# Patient Record
Sex: MALE | Race: BLACK OR AFRICAN AMERICAN | NOT HISPANIC OR LATINO | Employment: UNEMPLOYED | RURAL
[De-identification: names, ages, dates, MRNs, and addresses within clinical notes are randomized per-mention and may not be internally consistent; named-entity substitution may affect disease eponyms.]

---

## 2021-03-18 LAB
CHOLEST SERPL-MSCNC: 231 MG/DL (ref 0–200)
HDLC SERPL-MCNC: 52 MG/DL (ref 35–70)
LDLC SERPL CALC-MCNC: 161 MG/DL (ref 0–160)
TRIGL SERPL-MCNC: 92 MG/DL (ref 40–160)

## 2021-04-15 ENCOUNTER — HOSPITAL ENCOUNTER (EMERGENCY)
Facility: HOSPITAL | Age: 51
Discharge: HOME | End: 2021-04-16
Attending: EMERGENCY MEDICINE
Payer: MEDICARE

## 2021-04-15 DIAGNOSIS — I95.0 IDIOPATHIC HYPOTENSION: Primary | ICD-10-CM

## 2021-04-15 LAB
ALBUMIN SERPL BCP-MCNC: 3.8 G/DL (ref 3.5–5)
ALBUMIN/GLOB SERPL: 0.9 {RATIO}
ALP SERPL-CCNC: 54 U/L (ref 45–115)
ALT SERPL W P-5'-P-CCNC: 20 U/L (ref 16–61)
AMPHETAMINE: NEGATIVE NG/ML
ANION GAP SERPL CALCULATED.3IONS-SCNC: 12 MMOL/L
AST SERPL W P-5'-P-CCNC: 16 U/L (ref 15–37)
BARBITURATES: NEGATIVE NG/ML
BASOPHILS # BLD AUTO: 0.06 X10E3/UL (ref 0–0.2)
BASOPHILS NFR BLD AUTO: 0.4 % (ref 0–1)
BENZODIAZEPINES: NEGATIVE NG/ML
BILIRUB SERPL-MCNC: 0.3 MG/DL (ref 0–1.2)
BILIRUB UR QL STRIP: NEGATIVE MG/DL
BUN SERPL-MCNC: 18 MG/DL (ref 7–18)
BUN/CREAT SERPL: 16.5
CALCIUM SERPL-MCNC: 8.3 MG/DL (ref 8.5–10.1)
CANNABINOID: NEGATIVE NG/ML
CHLORIDE SERPL-SCNC: 111 MMOL/L (ref 98–107)
CLARITY UR: CLEAR
CO2 SERPL-SCNC: 28 MMOL/L (ref 21–32)
COCAINE: NEGATIVE NG/ML
COLOR UR: YELLOW
CREAT SERPL-MCNC: 1.09 MG/DL (ref 0.7–1.3)
EOSINOPHIL # BLD AUTO: 0.2 X10E3/UL (ref 0–0.5)
EOSINOPHIL NFR BLD AUTO: 1.3 % (ref 1–4)
ERYTHROCYTE [DISTWIDTH] IN BLOOD BY AUTOMATED COUNT: 14 % (ref 11.5–14.5)
GLOBULIN SER-MCNC: 4.3 G/DL (ref 2–4)
GLUCOSE SERPL-MCNC: 90 MG/DL (ref 74–106)
GLUCOSE UR STRIP-MCNC: NEGATIVE MG/DL
HCT VFR BLD AUTO: 44 % (ref 40–54)
HGB BLD-MCNC: 14.1 G/DL (ref 13.5–18)
IMM GRANULOCYTES # BLD AUTO: 0.06 X10E3/UL (ref 0–0.04)
IMM GRANULOCYTES NFR BLD: 0.4 % (ref 0–0.4)
KETONES UR STRIP-SCNC: ABNORMAL MG/DL
LEUKOCYTE ESTERASE UR QL STRIP: NEGATIVE LEU/UL
LYMPHOCYTES # BLD AUTO: 2 X10E3/UL (ref 1–4.8)
LYMPHOCYTES NFR BLD AUTO: 13 % (ref 27–41)
MCH RBC QN AUTO: 28.4 PG (ref 27–31)
MCHC RBC AUTO-ENTMCNC: 32 G/DL (ref 32–36)
MCV RBC AUTO: 88.5 FL (ref 80–96)
MONOCYTES # BLD AUTO: 0.71 X10E3/UL (ref 0–0.8)
MONOCYTES NFR BLD AUTO: 4.6 % (ref 2–6)
MPC BLD CALC-MCNC: 10.5 FL (ref 9.4–12.4)
NEUTROPHILS # BLD AUTO: 12.33 X10E3/UL (ref 1.8–7.7)
NEUTROPHILS NFR BLD AUTO: 80.3 % (ref 53–65)
NITRITE UR QL STRIP: NEGATIVE
NRBC # BLD AUTO: 0 X10E3/UL (ref 0–0)
NRBC, AUTO (.00): 0 /100 (ref 0–0)
OPIATES: NEGATIVE NG/ML
PH UR STRIP: 6 PH UNITS (ref 5–8)
PHENCYCLIDINE: POSITIVE NG/ML
PLATELET # BLD AUTO: 264 X10E3/UL (ref 150–400)
POTASSIUM SERPL-SCNC: 3.8 MMOL/L (ref 3.5–5.1)
PROT SERPL-MCNC: 8.1 G/DL (ref 6.4–8.2)
PROT UR QL STRIP: ABNORMAL MG/DL
RBC # BLD AUTO: 4.97 X10E6/UL (ref 4.6–6.2)
RBC # UR STRIP: NEGATIVE ERY/UL
SODIUM SERPL-SCNC: 147 MMOL/L (ref 136–145)
SP GR UR STRIP: >=1.03 (ref 1–1.03)
UROBILINOGEN UR STRIP-ACNC: 1 EU/DL
WBC # BLD AUTO: 15.36 X10E3/UL (ref 4.5–11)

## 2021-04-15 PROCEDURE — 99284 PR EMERGENCY DEPT VISIT,LEVEL IV: ICD-10-PCS | Mod: ,,, | Performed by: EMERGENCY MEDICINE

## 2021-04-15 PROCEDURE — 36415 COLL VENOUS BLD VENIPUNCTURE: CPT

## 2021-04-15 PROCEDURE — 85025 COMPLETE CBC W/AUTO DIFF WBC: CPT

## 2021-04-15 PROCEDURE — 96360 HYDRATION IV INFUSION INIT: CPT

## 2021-04-15 PROCEDURE — 99284 EMERGENCY DEPT VISIT MOD MDM: CPT | Mod: ,,, | Performed by: EMERGENCY MEDICINE

## 2021-04-15 PROCEDURE — 25000003 PHARM REV CODE 250: Performed by: EMERGENCY MEDICINE

## 2021-04-15 PROCEDURE — 80053 COMPREHEN METABOLIC PANEL: CPT

## 2021-04-15 PROCEDURE — 81003 URINALYSIS AUTO W/O SCOPE: CPT

## 2021-04-15 PROCEDURE — 99283 EMERGENCY DEPT VISIT LOW MDM: CPT | Mod: 25

## 2021-04-15 RX ADMIN — SODIUM CHLORIDE 1000 ML: 9 INJECTION, SOLUTION INTRAVENOUS at 07:04

## 2021-04-16 VITALS
BODY MASS INDEX: 23.7 KG/M2 | OXYGEN SATURATION: 100 % | HEIGHT: 69 IN | HEART RATE: 60 BPM | RESPIRATION RATE: 13 BRPM | DIASTOLIC BLOOD PRESSURE: 87 MMHG | WEIGHT: 160 LBS | TEMPERATURE: 98 F | SYSTOLIC BLOOD PRESSURE: 131 MMHG

## 2021-04-21 DIAGNOSIS — Z12.11 SCREEN FOR COLON CANCER: Primary | ICD-10-CM

## 2022-01-18 ENCOUNTER — OFFICE VISIT (OUTPATIENT)
Dept: FAMILY MEDICINE | Facility: CLINIC | Age: 52
End: 2022-01-18
Payer: MEDICAID

## 2022-01-18 VITALS — DIASTOLIC BLOOD PRESSURE: 102 MMHG | SYSTOLIC BLOOD PRESSURE: 156 MMHG | HEART RATE: 90 BPM

## 2022-01-18 DIAGNOSIS — I10 PRIMARY HYPERTENSION: Primary | Chronic | ICD-10-CM

## 2022-01-18 DIAGNOSIS — Z86.73 HISTORY OF CARDIOEMBOLIC CEREBROVASCULAR ACCIDENT (CVA): Chronic | ICD-10-CM

## 2022-01-18 DIAGNOSIS — I25.10 CORONARY ARTERY DISEASE, UNSPECIFIED VESSEL OR LESION TYPE, UNSPECIFIED WHETHER ANGINA PRESENT, UNSPECIFIED WHETHER NATIVE OR TRANSPLANTED HEART: Chronic | ICD-10-CM

## 2022-01-18 DIAGNOSIS — I25.2 HISTORY OF MI (MYOCARDIAL INFARCTION): Chronic | ICD-10-CM

## 2022-01-18 DIAGNOSIS — E78.2 MIXED HYPERLIPIDEMIA: Chronic | ICD-10-CM

## 2022-01-18 DIAGNOSIS — G81.91 RIGHT HEMIPLEGIA: Chronic | ICD-10-CM

## 2022-01-18 DIAGNOSIS — R47.01 EXPRESSIVE APHASIA: Chronic | ICD-10-CM

## 2022-01-18 DIAGNOSIS — J44.9 CHRONIC OBSTRUCTIVE PULMONARY DISEASE, UNSPECIFIED COPD TYPE: Chronic | ICD-10-CM

## 2022-01-18 PROCEDURE — 1160F PR REVIEW ALL MEDS BY PRESCRIBER/CLIN PHARMACIST DOCUMENTED: ICD-10-PCS | Mod: CPTII,,, | Performed by: FAMILY MEDICINE

## 2022-01-18 PROCEDURE — 1159F MED LIST DOCD IN RCRD: CPT | Mod: CPTII,,, | Performed by: FAMILY MEDICINE

## 2022-01-18 PROCEDURE — 4010F ACE/ARB THERAPY RXD/TAKEN: CPT | Mod: CPTII,,, | Performed by: FAMILY MEDICINE

## 2022-01-18 PROCEDURE — 3080F PR MOST RECENT DIASTOLIC BLOOD PRESSURE >= 90 MM HG: ICD-10-PCS | Mod: CPTII,,, | Performed by: FAMILY MEDICINE

## 2022-01-18 PROCEDURE — 99214 PR OFFICE/OUTPT VISIT, EST, LEVL IV, 30-39 MIN: ICD-10-PCS | Mod: ,,, | Performed by: FAMILY MEDICINE

## 2022-01-18 PROCEDURE — 3080F DIAST BP >= 90 MM HG: CPT | Mod: CPTII,,, | Performed by: FAMILY MEDICINE

## 2022-01-18 PROCEDURE — 3077F SYST BP >= 140 MM HG: CPT | Mod: CPTII,,, | Performed by: FAMILY MEDICINE

## 2022-01-18 PROCEDURE — 99214 OFFICE O/P EST MOD 30 MIN: CPT | Mod: ,,, | Performed by: FAMILY MEDICINE

## 2022-01-18 PROCEDURE — 1160F RVW MEDS BY RX/DR IN RCRD: CPT | Mod: CPTII,,, | Performed by: FAMILY MEDICINE

## 2022-01-18 PROCEDURE — 3077F PR MOST RECENT SYSTOLIC BLOOD PRESSURE >= 140 MM HG: ICD-10-PCS | Mod: CPTII,,, | Performed by: FAMILY MEDICINE

## 2022-01-18 PROCEDURE — 1159F PR MEDICATION LIST DOCUMENTED IN MEDICAL RECORD: ICD-10-PCS | Mod: CPTII,,, | Performed by: FAMILY MEDICINE

## 2022-01-18 PROCEDURE — 4010F PR ACE/ARB THEARPY RXD/TAKEN: ICD-10-PCS | Mod: CPTII,,, | Performed by: FAMILY MEDICINE

## 2022-01-18 RX ORDER — IPRATROPIUM BROMIDE AND ALBUTEROL SULFATE 2.5; .5 MG/3ML; MG/3ML
3 SOLUTION RESPIRATORY (INHALATION) EVERY 6 HOURS PRN
Qty: 75 ML | Refills: 3 | Status: SHIPPED | OUTPATIENT
Start: 2022-01-18 | End: 2023-06-14

## 2022-01-18 RX ORDER — LISINOPRIL 20 MG/1
20 TABLET ORAL DAILY
Qty: 90 TABLET | Refills: 3 | Status: SHIPPED | OUTPATIENT
Start: 2022-01-18 | End: 2022-06-30 | Stop reason: SDUPTHER

## 2022-01-18 NOTE — PROGRESS NOTES
Gilmar Harris MD   [facility]  MEDICAL GROUP [address] [address]  [phone]      PATIENT NAME: Gm Babcock  : 1970  DATE: 22  MRN: [id number]      Billing Provider: Gilmar Harris MD  Level of Service:   Patient PCP Information       Provider PCP Type    Gilmar Harris MD General            Reason for Visit / Chief Complaint: Follow-up and Cough       Update PCP  Update Chief Complaint         History of Present Illness / Problem Focused Workflow     Gm Babcock presents to the clinic with Follow-up and Cough       50 yo AAM with h/o HTN, hyperlpidemia, CAD, MI and CVA.  Has right sided hemiplegia and expressive aphasia.  He needs a couple of meds refilled today.      Review of Systems     Review of Systems   Respiratory: Positive for cough.    Musculoskeletal: Positive for gait problem.   Neurological: Positive for speech difficulty, weakness, disturbances in coordination and coordination difficulties.        Medical / Social / Family History     Past Medical History:   Diagnosis Date    Myocardial infarction     Seizures        History reviewed. No pertinent surgical history.    Social History    reports that he has quit smoking. He has never used smokeless tobacco. He reports current alcohol use.   Social History     Tobacco Use    Smoking status: Former Smoker    Smokeless tobacco: Never Used   Substance Use Topics    Alcohol use: Yes       Family History  Family History   Problem Relation Age of Onset    Coronary artery disease Mother        Medications and Allergies     Medications  No outpatient medications have been marked as taking for the 22 encounter (Office Visit) with Gilmar Harris MD.       Allergies  Review of patient's allergies indicates:  No Known Allergies    Physical Examination     Vitals:    22 1520   BP: (!) 156/102   Pulse: 90     Physical Exam  Vitals reviewed.    HENT:      Head: Normocephalic and atraumatic.   Eyes:      Extraocular Movements: Extraocular movements intact.      Conjunctiva/sclera: Conjunctivae normal.      Pupils: Pupils are equal, round, and reactive to light.   Cardiovascular:      Rate and Rhythm: Normal rate and regular rhythm.      Heart sounds: Normal heart sounds.   Pulmonary:      Effort: Pulmonary effort is normal.      Breath sounds: Normal breath sounds.   Musculoskeletal:      Cervical back: Normal range of motion and neck supple.   Skin:     General: Skin is warm and dry.   Neurological:      Mental Status: He is alert. Mental status is at baseline.      Motor: Weakness present.      Gait: Gait abnormal.   Psychiatric:         Mood and Affect: Mood normal.         Behavior: Behavior normal.          Assessment and Plan (including Health Maintenance)      Problem List  Smart Sets  Document Outside HM   :    Plan: continue current care        Health Maintenance Due   Topic Date Due    Hepatitis C Screening  Never done    Lipid Panel  Never done    COVID-19 Vaccine (1) Never done    Pneumococcal Vaccines (Age 0-64) (1 of 2 - PPSV23) Never done    HIV Screening  Never done    TETANUS VACCINE  Never done    High Dose Statin  Never done    Colorectal Cancer Screening  Never done    Shingles Vaccine (1 of 2) Never done    Influenza Vaccine (1) Never done       Problem List Items Addressed This Visit        Neuro    History of cardioembolic cerebrovascular accident (CVA) (Chronic)    Right hemiplegia (Chronic)    Expressive aphasia (Chronic)       Pulmonary    Chronic obstructive pulmonary disease (Chronic)    Relevant Medications    albuterol-ipratropium (DUO-NEB) 2.5 mg-0.5 mg/3 mL nebulizer solution       Cardiac/Vascular    Primary hypertension - Primary (Chronic)    Relevant Medications    lisinopriL (PRINIVIL,ZESTRIL) 20 MG tablet    Mixed hyperlipidemia (Chronic)    Coronary artery disease (Chronic)    History of MI (myocardial  infarction) (Chronic)          The patient has no Health Maintenance topics of status Not Due    Future Appointments   Date Time Provider Department Center   3/18/2022  9:00 AM Gilmar Harris MD [facility]            Signature:  MD [facility]AC[facility]  MEDICAL GROUP OF [facility]  [address] [place] [address] [place] [id number]  [phone]    Date of encounter: 1/18/22

## 2022-06-30 ENCOUNTER — OFFICE VISIT (OUTPATIENT)
Dept: FAMILY MEDICINE | Facility: CLINIC | Age: 52
End: 2022-06-30
Payer: MEDICAID

## 2022-06-30 VITALS
DIASTOLIC BLOOD PRESSURE: 81 MMHG | BODY MASS INDEX: 23.7 KG/M2 | HEIGHT: 69 IN | SYSTOLIC BLOOD PRESSURE: 114 MMHG | HEART RATE: 82 BPM | WEIGHT: 160 LBS

## 2022-06-30 DIAGNOSIS — I25.10 CORONARY ARTERY DISEASE, UNSPECIFIED VESSEL OR LESION TYPE, UNSPECIFIED WHETHER ANGINA PRESENT, UNSPECIFIED WHETHER NATIVE OR TRANSPLANTED HEART: Chronic | ICD-10-CM

## 2022-06-30 DIAGNOSIS — G81.91 RIGHT HEMIPLEGIA: Chronic | ICD-10-CM

## 2022-06-30 DIAGNOSIS — I10 PRIMARY HYPERTENSION: Primary | Chronic | ICD-10-CM

## 2022-06-30 DIAGNOSIS — R47.01 EXPRESSIVE APHASIA: Chronic | ICD-10-CM

## 2022-06-30 DIAGNOSIS — I25.2 HISTORY OF MI (MYOCARDIAL INFARCTION): Chronic | ICD-10-CM

## 2022-06-30 DIAGNOSIS — Z86.73 HISTORY OF CARDIOEMBOLIC CEREBROVASCULAR ACCIDENT (CVA): Chronic | ICD-10-CM

## 2022-06-30 PROCEDURE — 1160F RVW MEDS BY RX/DR IN RCRD: CPT | Mod: CPTII,,, | Performed by: FAMILY MEDICINE

## 2022-06-30 PROCEDURE — 3074F SYST BP LT 130 MM HG: CPT | Mod: CPTII,,, | Performed by: FAMILY MEDICINE

## 2022-06-30 PROCEDURE — 3008F BODY MASS INDEX DOCD: CPT | Mod: CPTII,,, | Performed by: FAMILY MEDICINE

## 2022-06-30 PROCEDURE — 1159F PR MEDICATION LIST DOCUMENTED IN MEDICAL RECORD: ICD-10-PCS | Mod: CPTII,,, | Performed by: FAMILY MEDICINE

## 2022-06-30 PROCEDURE — 4010F ACE/ARB THERAPY RXD/TAKEN: CPT | Mod: CPTII,,, | Performed by: FAMILY MEDICINE

## 2022-06-30 PROCEDURE — 99214 OFFICE O/P EST MOD 30 MIN: CPT | Mod: ,,, | Performed by: FAMILY MEDICINE

## 2022-06-30 PROCEDURE — 1160F PR REVIEW ALL MEDS BY PRESCRIBER/CLIN PHARMACIST DOCUMENTED: ICD-10-PCS | Mod: CPTII,,, | Performed by: FAMILY MEDICINE

## 2022-06-30 PROCEDURE — 99214 PR OFFICE/OUTPT VISIT, EST, LEVL IV, 30-39 MIN: ICD-10-PCS | Mod: ,,, | Performed by: FAMILY MEDICINE

## 2022-06-30 PROCEDURE — 3008F PR BODY MASS INDEX (BMI) DOCUMENTED: ICD-10-PCS | Mod: CPTII,,, | Performed by: FAMILY MEDICINE

## 2022-06-30 PROCEDURE — 4010F PR ACE/ARB THEARPY RXD/TAKEN: ICD-10-PCS | Mod: CPTII,,, | Performed by: FAMILY MEDICINE

## 2022-06-30 PROCEDURE — 3079F DIAST BP 80-89 MM HG: CPT | Mod: CPTII,,, | Performed by: FAMILY MEDICINE

## 2022-06-30 PROCEDURE — 3074F PR MOST RECENT SYSTOLIC BLOOD PRESSURE < 130 MM HG: ICD-10-PCS | Mod: CPTII,,, | Performed by: FAMILY MEDICINE

## 2022-06-30 PROCEDURE — 3079F PR MOST RECENT DIASTOLIC BLOOD PRESSURE 80-89 MM HG: ICD-10-PCS | Mod: CPTII,,, | Performed by: FAMILY MEDICINE

## 2022-06-30 PROCEDURE — 1159F MED LIST DOCD IN RCRD: CPT | Mod: CPTII,,, | Performed by: FAMILY MEDICINE

## 2022-06-30 RX ORDER — LISINOPRIL 20 MG/1
20 TABLET ORAL DAILY
Qty: 90 TABLET | Refills: 1 | Status: SHIPPED | OUTPATIENT
Start: 2022-06-30 | End: 2023-06-09 | Stop reason: SDUPTHER

## 2022-06-30 NOTE — PROGRESS NOTES
Gilmar Harris MD   [facility]  MEDICAL GROUP [address] [address]  [phone]      PATIENT NAME: Gm Babcock  : 1970  DATE: 22  MRN: [id number]      Billing Provider: Gilmar Harris MD  Level of Service:   Patient PCP Information       Provider PCP Type    Gilmar Harris MD General            Reason for Visit / Chief Complaint: Follow-up (Follow-up. Medication refills. )       Update PCP  Update Chief Complaint         History of Present Illness / Problem Focused Workflow     Gm Babcock presents to the clinic with Follow-up (Follow-up. Medication refills. )          50 yo AAM with h/o HTN, hyperlpidemia, CAD, MI and CVA.  Has right sided hemiplegia and expressive aphasia.  He is here today to have some medications refilled.  He denies being in any pain.  No new/qacute complaints.      Review of Systems     Review of Systems   Respiratory: Negative for cough.    Musculoskeletal: Positive for gait problem. Negative for arthralgias.   Neurological: Positive for speech difficulty, weakness, disturbances in coordination and coordination difficulties.        Medical / Social / Family History     Past Medical History:   Diagnosis Date    Myocardial infarction     Seizures        History reviewed. No pertinent surgical history.    Social History    reports that he has quit smoking. He has never used smokeless tobacco. He reports current alcohol use.   Social History     Tobacco Use    Smoking status: Former Smoker    Smokeless tobacco: Never Used   Substance Use Topics    Alcohol use: Yes       Family History  Family History   Problem Relation Age of Onset    Coronary artery disease Mother        Medications and Allergies     Medications  Outpatient Medications Marked as Taking for the 22 encounter (Office Visit) with Gilmar Harris MD   Medication Sig Dispense Refill    albuterol-ipratropium (DUO-NEB)  2.5 mg-0.5 mg/3 mL nebulizer solution Take 3 mLs by nebulization every 6 (six) hours as needed for Wheezing. Rescue 75 mL 3    [DISCONTINUED] ELIQUIS 5 mg Tab TAKE ONE TABLET BY MOUTH TWICE DAILY 60 tablet 0    [DISCONTINUED] lisinopriL (PRINIVIL,ZESTRIL) 20 MG tablet Take 1 tablet (20 mg total) by mouth once daily. 90 tablet 3       Allergies  Review of patient's allergies indicates:  No Known Allergies    Physical Examination     Vitals:    06/30/22 1527   BP: 114/81   Pulse: 82     Physical Exam  Vitals reviewed.   HENT:      Head: Normocephalic and atraumatic.   Eyes:      Extraocular Movements: Extraocular movements intact.      Conjunctiva/sclera: Conjunctivae normal.      Pupils: Pupils are equal, round, and reactive to light.   Cardiovascular:      Rate and Rhythm: Normal rate and regular rhythm.      Heart sounds: Normal heart sounds.   Pulmonary:      Effort: Pulmonary effort is normal.      Breath sounds: Normal breath sounds.   Musculoskeletal:      Cervical back: Normal range of motion and neck supple.      Comments: Contracture of RUE   Skin:     General: Skin is warm and dry.   Neurological:      Mental Status: He is alert. Mental status is at baseline.      Motor: Weakness (RUE and RLE) present.      Gait: Gait abnormal (WC bound).   Psychiatric:         Mood and Affect: Mood normal.         Behavior: Behavior normal.          Assessment and Plan (including Health Maintenance)      Problem List  Smart Sets  Document Outside HM   :    Plan:         Health Maintenance Due   Topic Date Due    Hepatitis C Screening  Never done    Pneumococcal Vaccines (Age 0-64) (1 - PCV) Never done    HIV Screening  Never done    High Dose Statin  Never done    Colorectal Cancer Screening  Never done    COVID-19 Vaccine (2 - Moderna series) 10/21/2021       Problem List Items Addressed This Visit        Neuro    History of cardioembolic cerebrovascular accident (CVA) (Chronic)    Relevant Medications    apixaban  (ELIQUIS) 5 mg Tab    Right hemiplegia (Chronic)    Expressive aphasia (Chronic)       Cardiac/Vascular    Primary hypertension - Primary (Chronic)    Relevant Medications    lisinopriL (PRINIVIL,ZESTRIL) 20 MG tablet    Coronary artery disease (Chronic)    History of MI (myocardial infarction) (Chronic)          Health Maintenance Topics with due status: Not Due       Topic Last Completion Date    Lipid Panel 03/18/2021    Influenza Vaccine Not Due       No future appointments.         Signature:  MD RACHEL Stewart [facility]  MEDICAL GROUP [facility] - FAMILY MEDICINE  [address] [place] [id number]  [phone]    Date of encounter: 6/30/22

## 2022-07-31 ENCOUNTER — HOSPITAL ENCOUNTER (EMERGENCY)
Facility: HOSPITAL | Age: 52
Discharge: HOME | End: 2022-08-01
Attending: EMERGENCY MEDICINE
Payer: MEDICAID

## 2022-07-31 DIAGNOSIS — E03.8 OTHER SPECIFIED HYPOTHYROIDISM: Primary | ICD-10-CM

## 2022-07-31 DIAGNOSIS — I63.9 STROKE: ICD-10-CM

## 2022-07-31 PROCEDURE — 85610 PROTHROMBIN TIME: CPT | Performed by: EMERGENCY MEDICINE

## 2022-07-31 PROCEDURE — 80061 LIPID PANEL: CPT | Performed by: EMERGENCY MEDICINE

## 2022-07-31 PROCEDURE — 85025 COMPLETE CBC W/AUTO DIFF WBC: CPT | Performed by: EMERGENCY MEDICINE

## 2022-07-31 PROCEDURE — 99285 EMERGENCY DEPT VISIT HI MDM: CPT

## 2022-07-31 PROCEDURE — 80053 COMPREHEN METABOLIC PANEL: CPT | Performed by: EMERGENCY MEDICINE

## 2022-07-31 PROCEDURE — 36415 COLL VENOUS BLD VENIPUNCTURE: CPT | Performed by: EMERGENCY MEDICINE

## 2022-07-31 PROCEDURE — 99284 EMERGENCY DEPT VISIT MOD MDM: CPT | Mod: ,,, | Performed by: EMERGENCY MEDICINE

## 2022-07-31 PROCEDURE — 99284 PR EMERGENCY DEPT VISIT,LEVEL IV: ICD-10-PCS | Mod: ,,, | Performed by: EMERGENCY MEDICINE

## 2022-07-31 PROCEDURE — 84443 ASSAY THYROID STIM HORMONE: CPT | Performed by: EMERGENCY MEDICINE

## 2022-08-01 VITALS
BODY MASS INDEX: 25.18 KG/M2 | RESPIRATION RATE: 15 BRPM | HEART RATE: 80 BPM | WEIGHT: 170 LBS | OXYGEN SATURATION: 99 % | HEIGHT: 69 IN | DIASTOLIC BLOOD PRESSURE: 88 MMHG | TEMPERATURE: 99 F | SYSTOLIC BLOOD PRESSURE: 131 MMHG

## 2022-08-01 LAB
ALBUMIN SERPL BCP-MCNC: 4.1 G/DL (ref 3.5–5)
ALBUMIN/GLOB SERPL: 0.9 {RATIO}
ALP SERPL-CCNC: 60 U/L (ref 45–115)
ALT SERPL W P-5'-P-CCNC: 14 U/L (ref 16–61)
ANION GAP SERPL CALCULATED.3IONS-SCNC: 12 MMOL/L (ref 7–16)
AST SERPL W P-5'-P-CCNC: 14 U/L (ref 15–37)
BASOPHILS # BLD AUTO: 0.04 K/UL (ref 0–0.2)
BASOPHILS NFR BLD AUTO: 0.5 % (ref 0–1)
BILIRUB SERPL-MCNC: 0.6 MG/DL (ref 0–1.2)
BUN SERPL-MCNC: 15 MG/DL (ref 7–18)
BUN/CREAT SERPL: 17 (ref 6–20)
CALCIUM SERPL-MCNC: 9.8 MG/DL (ref 8.5–10.1)
CHLORIDE SERPL-SCNC: 106 MMOL/L (ref 98–107)
CHOLEST SERPL-MCNC: 206 MG/DL (ref 0–200)
CHOLEST/HDLC SERPL: 4.5 {RATIO}
CO2 SERPL-SCNC: 26 MMOL/L (ref 21–32)
CREAT SERPL-MCNC: 0.89 MG/DL (ref 0.7–1.3)
DIFFERENTIAL METHOD BLD: ABNORMAL
EOSINOPHIL # BLD AUTO: 0.26 K/UL (ref 0–0.5)
EOSINOPHIL NFR BLD AUTO: 3.2 % (ref 1–4)
ERYTHROCYTE [DISTWIDTH] IN BLOOD BY AUTOMATED COUNT: 13.9 % (ref 11.5–14.5)
GLOBULIN SER-MCNC: 4.7 G/DL (ref 2–4)
GLUCOSE SERPL-MCNC: 81 MG/DL (ref 74–106)
HCT VFR BLD AUTO: 44.8 % (ref 40–54)
HDLC SERPL-MCNC: 46 MG/DL (ref 40–60)
HGB BLD-MCNC: 14.8 G/DL (ref 13.5–18)
IMM GRANULOCYTES # BLD AUTO: 0.01 K/UL (ref 0–0.04)
IMM GRANULOCYTES NFR BLD: 0.1 % (ref 0–0.4)
INR BLD: 1.14
LDLC SERPL CALC-MCNC: 139 MG/DL
LDLC/HDLC SERPL: 3 {RATIO}
LYMPHOCYTES # BLD AUTO: 3.17 K/UL (ref 1–4.8)
LYMPHOCYTES NFR BLD AUTO: 39.5 % (ref 27–41)
MCH RBC QN AUTO: 28.1 PG (ref 27–31)
MCHC RBC AUTO-ENTMCNC: 33 G/DL (ref 32–36)
MCV RBC AUTO: 85.2 FL (ref 80–96)
MONOCYTES # BLD AUTO: 0.6 K/UL (ref 0–0.8)
MONOCYTES NFR BLD AUTO: 7.5 % (ref 2–6)
MPC BLD CALC-MCNC: 11.2 FL (ref 9.4–12.4)
NEUTROPHILS # BLD AUTO: 3.94 K/UL (ref 1.8–7.7)
NEUTROPHILS NFR BLD AUTO: 49.2 % (ref 53–65)
NONHDLC SERPL-MCNC: 160 MG/DL
NRBC # BLD AUTO: 0 X10E3/UL
NRBC, AUTO (.00): 0 %
PLATELET # BLD AUTO: 335 K/UL (ref 150–400)
POTASSIUM SERPL-SCNC: 4 MMOL/L (ref 3.5–5.1)
PROT SERPL-MCNC: 8.8 G/DL (ref 6.4–8.2)
PROTHROMBIN TIME: 14.2 SECONDS (ref 11.7–14.7)
RBC # BLD AUTO: 5.26 M/UL (ref 4.6–6.2)
SODIUM SERPL-SCNC: 140 MMOL/L (ref 136–145)
TRIGL SERPL-MCNC: 107 MG/DL (ref 35–150)
TSH SERPL DL<=0.005 MIU/L-ACNC: 3.81 UIU/ML (ref 0.36–3.74)
VLDLC SERPL-MCNC: 21 MG/DL
WBC # BLD AUTO: 8.02 K/UL (ref 4.5–11)

## 2022-08-01 NOTE — ED TRIAGE NOTES
FAMILY BRINGS PATIENT TO ER WITH REPORT OF GENERALIZED WEAKNESS SINCE 5PM ON 7/30/2022. REPORTS SHE NOTICED HE HAD SOME FACIAL DROOPING ON LEFT SIDE AND MORE WEAKNESS THAN USUAL. PATIENT HAS HAD A STROKE IN THE PAST WHICH AFFECTED HIS RIGHT SIDE.

## 2022-08-03 DIAGNOSIS — E03.8 OTHER SPECIFIED HYPOTHYROIDISM: ICD-10-CM

## 2022-08-03 DIAGNOSIS — I63.9 CEREBROVASCULAR ACCIDENT (CVA), UNSPECIFIED MECHANISM: ICD-10-CM

## 2022-08-03 DIAGNOSIS — G81.91 RIGHT HEMIPLEGIA: ICD-10-CM

## 2022-10-14 ENCOUNTER — OFFICE VISIT (OUTPATIENT)
Dept: NEUROLOGY | Facility: CLINIC | Age: 52
End: 2022-10-14
Payer: MEDICAID

## 2022-10-14 VITALS
WEIGHT: 162 LBS | OXYGEN SATURATION: 98 % | RESPIRATION RATE: 20 BRPM | DIASTOLIC BLOOD PRESSURE: 88 MMHG | HEART RATE: 68 BPM | BODY MASS INDEX: 23.99 KG/M2 | HEIGHT: 69 IN | SYSTOLIC BLOOD PRESSURE: 122 MMHG

## 2022-10-14 DIAGNOSIS — Z86.73 HISTORY OF CARDIOEMBOLIC CEREBROVASCULAR ACCIDENT (CVA): Primary | Chronic | ICD-10-CM

## 2022-10-14 PROCEDURE — 3079F PR MOST RECENT DIASTOLIC BLOOD PRESSURE 80-89 MM HG: ICD-10-PCS | Mod: CPTII,,, | Performed by: PSYCHIATRY & NEUROLOGY

## 2022-10-14 PROCEDURE — 99214 OFFICE O/P EST MOD 30 MIN: CPT | Mod: PBBFAC | Performed by: PSYCHIATRY & NEUROLOGY

## 2022-10-14 PROCEDURE — 3079F DIAST BP 80-89 MM HG: CPT | Mod: CPTII,,, | Performed by: PSYCHIATRY & NEUROLOGY

## 2022-10-14 PROCEDURE — 3074F PR MOST RECENT SYSTOLIC BLOOD PRESSURE < 130 MM HG: ICD-10-PCS | Mod: CPTII,,, | Performed by: PSYCHIATRY & NEUROLOGY

## 2022-10-14 PROCEDURE — 99204 PR OFFICE/OUTPT VISIT, NEW, LEVL IV, 45-59 MIN: ICD-10-PCS | Mod: S$PBB,,, | Performed by: PSYCHIATRY & NEUROLOGY

## 2022-10-14 PROCEDURE — 1160F PR REVIEW ALL MEDS BY PRESCRIBER/CLIN PHARMACIST DOCUMENTED: ICD-10-PCS | Mod: CPTII,,, | Performed by: PSYCHIATRY & NEUROLOGY

## 2022-10-14 PROCEDURE — 3074F SYST BP LT 130 MM HG: CPT | Mod: CPTII,,, | Performed by: PSYCHIATRY & NEUROLOGY

## 2022-10-14 PROCEDURE — 4010F PR ACE/ARB THEARPY RXD/TAKEN: ICD-10-PCS | Mod: CPTII,,, | Performed by: PSYCHIATRY & NEUROLOGY

## 2022-10-14 PROCEDURE — 4010F ACE/ARB THERAPY RXD/TAKEN: CPT | Mod: CPTII,,, | Performed by: PSYCHIATRY & NEUROLOGY

## 2022-10-14 PROCEDURE — 99204 OFFICE O/P NEW MOD 45 MIN: CPT | Mod: S$PBB,,, | Performed by: PSYCHIATRY & NEUROLOGY

## 2022-10-14 PROCEDURE — 1159F MED LIST DOCD IN RCRD: CPT | Mod: CPTII,,, | Performed by: PSYCHIATRY & NEUROLOGY

## 2022-10-14 PROCEDURE — 1159F PR MEDICATION LIST DOCUMENTED IN MEDICAL RECORD: ICD-10-PCS | Mod: CPTII,,, | Performed by: PSYCHIATRY & NEUROLOGY

## 2022-10-14 PROCEDURE — 1160F RVW MEDS BY RX/DR IN RCRD: CPT | Mod: CPTII,,, | Performed by: PSYCHIATRY & NEUROLOGY

## 2022-10-14 RX ORDER — ASPIRIN 81 MG/1
81 TABLET ORAL DAILY
COMMUNITY

## 2022-10-14 RX ORDER — ATORVASTATIN CALCIUM 40 MG/1
40 TABLET, FILM COATED ORAL DAILY
COMMUNITY
End: 2023-06-09 | Stop reason: SDUPTHER

## 2022-10-14 NOTE — PATIENT INSTRUCTIONS
Cont current meds   Cont ASPIRIN and eliquis and statin  Cont smoking cessation and ETOH cessation   F/u 6 months

## 2022-10-14 NOTE — PROGRESS NOTES
"    Subjective:       Patient ID: Gm Babcock is a 51 y.o. male     Chief Complaint:    Chief Complaint   Patient presents with    Stroke        Allergies:  Patient has no known allergies.    Current Medications:    Outpatient Encounter Medications as of 10/14/2022   Medication Sig Dispense Refill    albuterol-ipratropium (DUO-NEB) 2.5 mg-0.5 mg/3 mL nebulizer solution Take 3 mLs by nebulization every 6 (six) hours as needed for Wheezing. Rescue 75 mL 3    apixaban (ELIQUIS) 5 mg Tab Take 1 tablet (5 mg total) by mouth 2 (two) times daily. 180 tablet 1    aspirin (ECOTRIN) 81 MG EC tablet Take 81 mg by mouth once daily.      atorvastatin (LIPITOR) 40 MG tablet Take 40 mg by mouth once daily.      lisinopriL (PRINIVIL,ZESTRIL) 20 MG tablet Take 1 tablet (20 mg total) by mouth once daily. 90 tablet 1     No facility-administered encounter medications on file as of 10/14/2022.       History of Present Illness  52 yo BM s/p L MCA stroke 3-5 yrs ago w/ residual expressive aphasia and R hemiparesis  Cared fo rby sister and wheelchair bound   Pt never got any proper PT/Rehab after his stroke according to sister  Now on Eliquis , ASPIRIN , statin   Few yrs ago had spell similar to TRANSIENT ISCHEMIC ATTACK but CT head negative and per sister had "thyroid issues" ?        Past Medical History:   Diagnosis Date    Myocardial infarction     Seizures     Stroke        History reviewed. No pertinent surgical history.    Social History  Mr. Babcock  reports that he has quit smoking. He has never used smokeless tobacco. He reports current alcohol use.    Family History  Mr.'s Babcock family history includes Coronary artery disease in his mother.    Review of Systems  Review of Systems   Neurological:  Positive for focal weakness and weakness.   All other systems reviewed and are negative.   Objective:   /88 (BP Location: Left arm, Patient Position: Sitting, BP Method: Large (Manual))   Pulse 68   Resp 20   Ht 5' 9" (1.753 " m)   Wt 73.5 kg (162 lb)   SpO2 98%   BMI 23.92 kg/m²    NEUROLOGICAL EXAMINATION:     MENTAL STATUS   Level of consciousness: alert  Knowledge: consistent with education.   Normal comprehension.        Expressive aphasia      CRANIAL NERVES   Cranial nerves II through XII intact.        Mild facial droop      MOTOR EXAM        Residual R hemiparesis from stroke     GAIT AND COORDINATION        Wheelchari bound      Physical Exam  Vitals reviewed.   Constitutional:       Appearance: He is normal weight.   Neurological:      Mental Status: Mental status is at baseline.      Cranial Nerves: Cranial nerves 2-12 are intact.        Assessment:     History of cardioembolic cerebrovascular accident (CVA)       Primary Diagnosis and ICD10  History of cardioembolic cerebrovascular accident (CVA) [Z86.73]    Plan:     Patient Instructions   Cont current meds   Cont ASPIRIN and eliquis and statin  Cont smoking cessation and ETOH cessation   F/u 6 months     There are no discontinued medications.    Requested Prescriptions      No prescriptions requested or ordered in this encounter

## 2023-06-09 ENCOUNTER — HOSPITAL ENCOUNTER (EMERGENCY)
Facility: HOSPITAL | Age: 53
Discharge: HOME | End: 2023-06-09
Payer: MEDICAID

## 2023-06-09 ENCOUNTER — OFFICE VISIT (OUTPATIENT)
Dept: FAMILY MEDICINE | Facility: CLINIC | Age: 53
End: 2023-06-09
Payer: MEDICAID

## 2023-06-09 VITALS
DIASTOLIC BLOOD PRESSURE: 90 MMHG | WEIGHT: 162 LBS | TEMPERATURE: 98 F | OXYGEN SATURATION: 98 % | HEIGHT: 69 IN | SYSTOLIC BLOOD PRESSURE: 138 MMHG | BODY MASS INDEX: 23.99 KG/M2 | HEART RATE: 74 BPM | RESPIRATION RATE: 18 BRPM

## 2023-06-09 VITALS
BODY MASS INDEX: 23.99 KG/M2 | HEIGHT: 69 IN | WEIGHT: 162 LBS | HEART RATE: 66 BPM | SYSTOLIC BLOOD PRESSURE: 133 MMHG | DIASTOLIC BLOOD PRESSURE: 84 MMHG

## 2023-06-09 DIAGNOSIS — Z92.29 HX OF LONG TERM USE OF BLOOD THINNERS: ICD-10-CM

## 2023-06-09 DIAGNOSIS — M62.838 MUSCLE SPASM: ICD-10-CM

## 2023-06-09 DIAGNOSIS — Z12.11 SCREEN FOR COLON CANCER: ICD-10-CM

## 2023-06-09 DIAGNOSIS — G81.91 RIGHT HEMIPLEGIA: Chronic | ICD-10-CM

## 2023-06-09 DIAGNOSIS — Z86.73 HISTORY OF CARDIOEMBOLIC CEREBROVASCULAR ACCIDENT (CVA): Chronic | ICD-10-CM

## 2023-06-09 DIAGNOSIS — R07.9 CHEST PAIN, UNSPECIFIED TYPE: Primary | ICD-10-CM

## 2023-06-09 DIAGNOSIS — E78.2 MIXED HYPERLIPIDEMIA: Chronic | ICD-10-CM

## 2023-06-09 DIAGNOSIS — R47.01 EXPRESSIVE APHASIA: Primary | Chronic | ICD-10-CM

## 2023-06-09 DIAGNOSIS — I10 PRIMARY HYPERTENSION: Chronic | ICD-10-CM

## 2023-06-09 LAB
ALBUMIN SERPL BCP-MCNC: 3.6 G/DL (ref 3.5–5)
ALBUMIN/GLOB SERPL: 0.8 {RATIO}
ALP SERPL-CCNC: 61 U/L (ref 45–115)
ALT SERPL W P-5'-P-CCNC: 19 U/L (ref 16–61)
AMPHET UR QL SCN: NEGATIVE
ANION GAP SERPL CALCULATED.3IONS-SCNC: 13 MMOL/L (ref 7–16)
AST SERPL W P-5'-P-CCNC: 17 U/L (ref 15–37)
BARBITURATES UR QL SCN: NEGATIVE
BASOPHILS # BLD AUTO: 0.04 K/UL (ref 0–0.2)
BASOPHILS NFR BLD AUTO: 0.6 % (ref 0–1)
BENZODIAZ METAB UR QL SCN: NEGATIVE
BILIRUB SERPL-MCNC: 0.4 MG/DL (ref ?–1.2)
BILIRUB UR QL STRIP: NEGATIVE
BUN SERPL-MCNC: 12 MG/DL (ref 7–18)
BUN/CREAT SERPL: 15 (ref 6–20)
CALCIUM SERPL-MCNC: 8.8 MG/DL (ref 8.5–10.1)
CANNABINOIDS UR QL SCN: NEGATIVE
CHLORIDE SERPL-SCNC: 104 MMOL/L (ref 98–107)
CLARITY UR: CLEAR
CO2 SERPL-SCNC: 30 MMOL/L (ref 21–32)
COCAINE UR QL SCN: NEGATIVE
COLOR UR: YELLOW
CREAT SERPL-MCNC: 0.82 MG/DL (ref 0.7–1.3)
DIFFERENTIAL METHOD BLD: ABNORMAL
EGFR (NO RACE VARIABLE) (RUSH/TITUS): 106 ML/MIN/1.73M2
EOSINOPHIL # BLD AUTO: 0.3 K/UL (ref 0–0.5)
EOSINOPHIL NFR BLD AUTO: 4.3 % (ref 1–4)
ERYTHROCYTE [DISTWIDTH] IN BLOOD BY AUTOMATED COUNT: 14.4 % (ref 11.5–14.5)
GLOBULIN SER-MCNC: 4.4 G/DL (ref 2–4)
GLUCOSE SERPL-MCNC: 82 MG/DL (ref 74–106)
GLUCOSE UR STRIP-MCNC: NEGATIVE MG/DL
HCT VFR BLD AUTO: 38.1 % (ref 40–54)
HGB BLD-MCNC: 13 G/DL (ref 13.5–18)
KETONES UR STRIP-SCNC: NEGATIVE MG/DL
LEUKOCYTE ESTERASE UR QL STRIP: NEGATIVE
LIPASE SERPL-CCNC: 111 U/L (ref 73–393)
LYMPHOCYTES # BLD AUTO: 2.2 K/UL (ref 1–4.8)
LYMPHOCYTES NFR BLD AUTO: 31.9 % (ref 27–41)
MAGNESIUM SERPL-MCNC: 1.8 MG/DL (ref 1.7–2.3)
MCH RBC QN AUTO: 28.3 PG (ref 27–31)
MCHC RBC AUTO-ENTMCNC: 34.1 G/DL (ref 32–36)
MCV RBC AUTO: 82.8 FL (ref 80–96)
MONOCYTES # BLD AUTO: 0.6 K/UL (ref 0–0.8)
MONOCYTES NFR BLD AUTO: 8.7 % (ref 2–6)
MPC BLD CALC-MCNC: 10.1 FL (ref 9.4–12.4)
NEUTROPHILS # BLD AUTO: 3.76 K/UL (ref 1.8–7.7)
NEUTROPHILS NFR BLD AUTO: 54.5 % (ref 53–65)
NITRITE UR QL STRIP: NEGATIVE
NT-PROBNP SERPL-MCNC: 83 PG/ML (ref 1–125)
OPIATES UR QL SCN: NEGATIVE
PCP UR QL SCN: NEGATIVE
PH UR STRIP: 7 PH UNITS
PLATELET # BLD AUTO: 291 K/UL (ref 150–400)
POTASSIUM SERPL-SCNC: 3.5 MMOL/L (ref 3.5–5.1)
PROT SERPL-MCNC: 8 G/DL (ref 6.4–8.2)
PROT UR QL STRIP: NEGATIVE
RBC # BLD AUTO: 4.6 M/UL (ref 4.6–6.2)
RBC # UR STRIP: NEGATIVE /UL
SODIUM SERPL-SCNC: 143 MMOL/L (ref 136–145)
SP GR UR STRIP: 1.02
TROPONIN I SERPL DL<=0.01 NG/ML-MCNC: 45.3 PG/ML
TSH SERPL DL<=0.005 MIU/L-ACNC: 2.04 UIU/ML (ref 0.36–3.74)
UROBILINOGEN UR STRIP-ACNC: 1 MG/DL
WBC # BLD AUTO: 6.9 K/UL (ref 4.5–11)

## 2023-06-09 PROCEDURE — 99214 PR OFFICE/OUTPT VISIT, EST, LEVL IV, 30-39 MIN: ICD-10-PCS | Mod: ,,, | Performed by: FAMILY MEDICINE

## 2023-06-09 PROCEDURE — 85025 COMPLETE CBC W/AUTO DIFF WBC: CPT | Performed by: NURSE PRACTITIONER

## 2023-06-09 PROCEDURE — 63600175 PHARM REV CODE 636 W HCPCS: Performed by: NURSE PRACTITIONER

## 2023-06-09 PROCEDURE — 4010F PR ACE/ARB THEARPY RXD/TAKEN: ICD-10-PCS | Mod: CPTII,,, | Performed by: FAMILY MEDICINE

## 2023-06-09 PROCEDURE — 4010F ACE/ARB THERAPY RXD/TAKEN: CPT | Mod: CPTII,,, | Performed by: FAMILY MEDICINE

## 2023-06-09 PROCEDURE — 96374 THER/PROPH/DIAG INJ IV PUSH: CPT

## 2023-06-09 PROCEDURE — 3008F PR BODY MASS INDEX (BMI) DOCUMENTED: ICD-10-PCS | Mod: CPTII,,, | Performed by: FAMILY MEDICINE

## 2023-06-09 PROCEDURE — 3075F PR MOST RECENT SYSTOLIC BLOOD PRESS GE 130-139MM HG: ICD-10-PCS | Mod: CPTII,,, | Performed by: FAMILY MEDICINE

## 2023-06-09 PROCEDURE — 99285 EMERGENCY DEPT VISIT HI MDM: CPT | Mod: 25

## 2023-06-09 PROCEDURE — 80053 COMPREHEN METABOLIC PANEL: CPT | Performed by: NURSE PRACTITIONER

## 2023-06-09 PROCEDURE — 3079F PR MOST RECENT DIASTOLIC BLOOD PRESSURE 80-89 MM HG: ICD-10-PCS | Mod: CPTII,,, | Performed by: FAMILY MEDICINE

## 2023-06-09 PROCEDURE — 3079F DIAST BP 80-89 MM HG: CPT | Mod: CPTII,,, | Performed by: FAMILY MEDICINE

## 2023-06-09 PROCEDURE — C9113 INJ PANTOPRAZOLE SODIUM, VIA: HCPCS | Performed by: NURSE PRACTITIONER

## 2023-06-09 PROCEDURE — 94761 N-INVAS EAR/PLS OXIMETRY MLT: CPT

## 2023-06-09 PROCEDURE — 99284 EMERGENCY DEPT VISIT MOD MDM: CPT | Mod: ,,, | Performed by: NURSE PRACTITIONER

## 2023-06-09 PROCEDURE — 99214 OFFICE O/P EST MOD 30 MIN: CPT | Mod: ,,, | Performed by: FAMILY MEDICINE

## 2023-06-09 PROCEDURE — 93010 ELECTROCARDIOGRAM REPORT: CPT | Mod: ,,, | Performed by: HOSPITALIST

## 2023-06-09 PROCEDURE — 3008F BODY MASS INDEX DOCD: CPT | Mod: CPTII,,, | Performed by: FAMILY MEDICINE

## 2023-06-09 PROCEDURE — 84484 ASSAY OF TROPONIN QUANT: CPT | Performed by: NURSE PRACTITIONER

## 2023-06-09 PROCEDURE — 83690 ASSAY OF LIPASE: CPT | Performed by: NURSE PRACTITIONER

## 2023-06-09 PROCEDURE — 81003 URINALYSIS AUTO W/O SCOPE: CPT | Mod: 59 | Performed by: NURSE PRACTITIONER

## 2023-06-09 PROCEDURE — 99284 PR EMERGENCY DEPT VISIT,LEVEL IV: ICD-10-PCS | Mod: ,,, | Performed by: NURSE PRACTITIONER

## 2023-06-09 PROCEDURE — 3075F SYST BP GE 130 - 139MM HG: CPT | Mod: CPTII,,, | Performed by: FAMILY MEDICINE

## 2023-06-09 PROCEDURE — 83880 ASSAY OF NATRIURETIC PEPTIDE: CPT | Performed by: NURSE PRACTITIONER

## 2023-06-09 PROCEDURE — 25000003 PHARM REV CODE 250: Performed by: NURSE PRACTITIONER

## 2023-06-09 PROCEDURE — 83735 ASSAY OF MAGNESIUM: CPT | Performed by: NURSE PRACTITIONER

## 2023-06-09 PROCEDURE — 93005 ELECTROCARDIOGRAM TRACING: CPT

## 2023-06-09 PROCEDURE — 84443 ASSAY THYROID STIM HORMONE: CPT | Performed by: NURSE PRACTITIONER

## 2023-06-09 PROCEDURE — 93010 EKG 12-LEAD: ICD-10-PCS | Mod: ,,, | Performed by: HOSPITALIST

## 2023-06-09 PROCEDURE — 96375 TX/PRO/DX INJ NEW DRUG ADDON: CPT

## 2023-06-09 PROCEDURE — 80307 DRUG TEST PRSMV CHEM ANLYZR: CPT | Performed by: NURSE PRACTITIONER

## 2023-06-09 RX ORDER — MAG HYDROX/ALUMINUM HYD/SIMETH 200-200-20
30 SUSPENSION, ORAL (FINAL DOSE FORM) ORAL
Status: COMPLETED | OUTPATIENT
Start: 2023-06-09 | End: 2023-06-09

## 2023-06-09 RX ORDER — ATORVASTATIN CALCIUM 40 MG/1
40 TABLET, FILM COATED ORAL DAILY
Qty: 90 TABLET | Refills: 1 | Status: SHIPPED | OUTPATIENT
Start: 2023-06-09 | End: 2024-02-08 | Stop reason: SDUPTHER

## 2023-06-09 RX ORDER — KETOROLAC TROMETHAMINE 30 MG/ML
15 INJECTION, SOLUTION INTRAMUSCULAR; INTRAVENOUS
Status: COMPLETED | OUTPATIENT
Start: 2023-06-09 | End: 2023-06-09

## 2023-06-09 RX ORDER — PANTOPRAZOLE SODIUM 40 MG/1
40 TABLET, DELAYED RELEASE ORAL DAILY
Qty: 30 TABLET | Refills: 0 | Status: SHIPPED | OUTPATIENT
Start: 2023-06-09 | End: 2024-02-08 | Stop reason: SDUPTHER

## 2023-06-09 RX ORDER — TIZANIDINE 2 MG/1
4 TABLET ORAL EVERY 8 HOURS PRN
Qty: 30 TABLET | Refills: 0 | Status: SHIPPED | OUTPATIENT
Start: 2023-06-09 | End: 2023-06-19

## 2023-06-09 RX ORDER — LISINOPRIL 20 MG/1
20 TABLET ORAL DAILY
Qty: 90 TABLET | Refills: 1 | Status: SHIPPED | OUTPATIENT
Start: 2023-06-09 | End: 2024-02-08 | Stop reason: SDUPTHER

## 2023-06-09 RX ORDER — PANTOPRAZOLE SODIUM 40 MG/10ML
40 INJECTION, POWDER, LYOPHILIZED, FOR SOLUTION INTRAVENOUS
Status: COMPLETED | OUTPATIENT
Start: 2023-06-09 | End: 2023-06-09

## 2023-06-09 RX ADMIN — PANTOPRAZOLE SODIUM 40 MG: 40 INJECTION, POWDER, LYOPHILIZED, FOR SOLUTION INTRAVENOUS at 12:06

## 2023-06-09 RX ADMIN — KETOROLAC TROMETHAMINE 15 MG: 30 INJECTION, SOLUTION INTRAMUSCULAR; INTRAVENOUS at 12:06

## 2023-06-09 RX ADMIN — ALUMINUM HYDROXIDE, MAGNESIUM HYDROXIDE, AND SIMETHICONE 30 ML: 200; 200; 20 SUSPENSION ORAL at 11:06

## 2023-06-09 NOTE — ED PROVIDER NOTES
Encounter Date: 6/9/2023       History     Chief Complaint   Patient presents with    Chest Pain     Non-verbal pt sent from clinic for chest pain that began yesterday.      Presented with sister/caregiver c/o chest pain and SOB that started during the day yesterday and remain constant today. Pt is aphasic (and w/c bound) from previous CVAs, so sister is primary historian. Pt is able to nod yes or no and make some hand motions for communication. Sister reports that pt c/o not feeling well yesterday so she made him an appt to see his PCP today. Upon arrival at PCP office and reporting chest pain, he was told to come to the ED for evaluation today. Pt reports that pain is constant sharp pain in the middle of his chest. He denies radiation of pain. He reports SOB but denies n/v or diaphoresis. Denies previous similar episodes of pain. PMH of CVA due to ETOH use. Denies ETOH or illicit drug use now. Denies history of heart disease. Says that a heart cath 2 years ago that was negative for CAD.    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 No pertinent surgical history.  Family History   Problem Relation Age of Onset    Coronary artery disease Mother      Social History     Tobacco Use    Smoking status: Former    Smokeless tobacco: Never   Substance Use Topics    Alcohol use: Not Currently     Review of Systems   Constitutional:  Negative for activity change, appetite change and fever.   Respiratory:  Positive for shortness of breath. Negative for cough and chest tightness.    Cardiovascular:  Positive for chest pain (constant sharp pain). Negative for leg swelling.   Gastrointestinal:  Negative for abdominal pain, blood in stool, constipation, diarrhea, nausea and vomiting.   Genitourinary:  Negative for decreased urine volume and  difficulty urinating.   Musculoskeletal:  Positive for gait problem.   Skin:  Negative for color change.   Neurological:  Positive for weakness (right sided paralysis). Negative for headaches.     Physical Exam     Initial Vitals   BP Pulse Resp Temp SpO2   06/09/23 1036 06/09/23 1036 06/09/23 1036 06/09/23 1045 06/09/23 1036   (!) 136/91 84 18 98.3 °F (36.8 °C) 99 %      MAP       --                Physical Exam    Nursing note and vitals reviewed.  Constitutional: He appears well-developed and well-nourished. No distress.   HENT:   Head: Normocephalic.   Nose: Nose normal.   Mouth/Throat: Oropharynx is clear and moist.   Eyes: Conjunctivae and EOM are normal.   Neck: Neck supple. No JVD present.   Normal range of motion.  Cardiovascular:  Normal rate, regular rhythm, normal heart sounds and intact distal pulses.           No murmur heard.  Pulmonary/Chest: Breath sounds normal. No respiratory distress. He has no wheezes. He has no rhonchi. He has no rales. He exhibits tenderness.   Abdominal: Abdomen is soft. Bowel sounds are normal. He exhibits no distension. There is no abdominal tenderness. There is no rebound and no guarding.   Musculoskeletal:         General: No tenderness or edema.      Cervical back: Normal range of motion and neck supple.     Neurological: He is alert and oriented to person, place, and time. GCS score is 15. GCS eye subscore is 4. GCS verbal subscore is 5. GCS motor subscore is 6.   Right sided paralysis with contracture to right hand   Skin: Skin is warm and dry. Capillary refill takes less than 2 seconds.       Medical Screening Exam   See Full Note    ED Course   Procedures  Labs Reviewed   COMPREHENSIVE METABOLIC PANEL - Abnormal; Notable for the following components:       Result Value    Globulin 4.4 (*)     All other components within normal limits   CBC WITH DIFFERENTIAL - Abnormal; Notable for the following components:    Hemoglobin 13.0 (*)     Hematocrit 38.1 (*)     Monocytes  % 8.7 (*)     Eosinophils % 4.3 (*)     All other components within normal limits   DRUG SCREEN, URINE (BEAKER) - Normal   NT-PRO NATRIURETIC PEPTIDE - Normal   LIPASE - Normal   MAGNESIUM - Normal   TROPONIN I - Normal   CBC W/ AUTO DIFFERENTIAL    Narrative:     The following orders were created for panel order CBC auto differential.  Procedure                               Abnormality         Status                     ---------                               -----------         ------                     CBC with Differential[[id number]]        Abnormal            Final result                 Please view results for these tests on the individual orders.   URINALYSIS, REFLEX TO URINE CULTURE   TSH     EKG Readings: (Independently Interpreted)   Initial Reading: No STEMI. Previous EKG: Compared with most recent EKG Previous EKG Date: 4/15/21. Rhythm: Normal Sinus Rhythm. Heart Rate: 64. Other Impression: Flipped Twaves in leads V3-V6 noted on 4/15/21 EKG have resolved in leads V3-V4 and are flat in leads V5-V6 now.   Reviewed at 1030.   ECG Results              EKG 12-lead (In process)  Result time 06/09/23 10:36:37      In process by Interface, Lab In [facility] (06/09/23 10:36:37)                   Narrative:    Test Reason : R07.9,    Vent. Rate : 064 BPM     Atrial Rate : 064 BPM     P-R Int : 168 ms          QRS Dur : 074 ms      QT Int : 386 ms       P-R-T Axes : 039 043 066 degrees     QTc Int : 398 ms    Normal sinus rhythm  Nonspecific T wave abnormality  Abnormal ECG  When compared with ECG of 15-APR-2021 19:29,  PREVIOUS ECG IS PRESENT    Referred By:  ROB           Confirmed By:                                   Imaging Results              X-Ray Chest AP Portable (Final result)  Result time 06/09/23 10:52:58      Final result by Jovani Scruggs MD (06/09/23 10:52:58)                   Impression:      Low lung volumes with vascular crowding.  No focal infiltrate.      Electronically signed by: Jovani  Ray  Date:    06/09/2023  Time:    10:52               Narrative:    EXAMINATION:  XR CHEST AP PORTABLE    CLINICAL HISTORY:  Chest Pain;    TECHNIQUE:  Single frontal view of the chest was performed.    COMPARISON:  9/9/2019    FINDINGS:  There are lower lung volumes with vascular crowding.  No focal infiltrate.  No pneumothorax.  No pleural effusion.                                    X-Rays:   Independently Interpreted Readings:   Chest X-Ray:   There are lower lung volumes with vascular crowding.  No focal infiltrate.  No pneumothorax.  No pleural effusion.   Medications   aluminum-magnesium hydroxide-simethicone 200-200-20 mg/5 mL suspension 30 mL (30 mLs Oral Given 6/9/23 1134)   ketorolac injection 15 mg (15 mg Intravenous Given 6/9/23 1225)   pantoprazole injection 40 mg (40 mg Intravenous Given 6/9/23 1225)     Medical Decision Making:   ED Management:  Presented with sister/caregiver c/o chest pain and SOB that started during the day yesterday and remain constant today. Pt is aphasic (and w/c bound) from previous CVAs, so sister is primary historian. Pt is able to nod yes or no and make some hand motions for communication. Sister reports that pt c/o not feeling well yesterday so she made him an appt to see his PCP today. Upon arrival at PCP office and reporting chest pain, he was told to come to the ED for evaluation today. Pt reports that pain is constant sharp pain in the middle of his chest. He denies radiation of pain. He reports SOB but denies n/v or diaphoresis. Denies previous similar episodes of pain. PMH of CVA due to ETOH use. Denies ETOH or illicit drug use now. Denies history of heart disease. Says that a heart cath 2 years ago that was negative for CAD.    EKG ordered and reviewed. Compared with previous on 4/15/21. NSR with rate 64. CXR ordered and reviewed. It shows no acute findings.Labs ordered and reviewed. WBC 6900 with H&H 13 and 38.1 and plt 444380, Mg 1.8, K+ 3.5, Na 143, BNP 83, BUN  12, creatinine 0.82, LFTs WNL. :ipase 111. UA is clear. UDS is negative.  Review of EMR shows slightly elevated TSH previously. Pt is not on thyroid med at present. TSH ordered and is pending.   Heart score =2 JESUS score =1    Maalox po given.  Troponin resulted and is 45. Pt has been having pain that is constant since yesterday.  DIScussed diagnostic results with pt and sister. Pt says that pain is still present. Protonix 40 mg IVP and Toradyl 15 mg IVP ordered. Pt is taking Eliquis but is not on PPI. Explained need for PPI while taking blood thinner. Will start today.    Discharged home with detailed home care and follow-up instructions provided. Rx for Protonix.  Additional MDM:   Heart Score:    History:          Slightly suspicious.  ECG:             Normal  Age:               45-65 years  Risk factors: 1-2 risk factors  Troponin:       Less than or equal to normal limit  Final Score: 2      JESUS Score:   Age over 65:                                    0.00   > or = to 3 CAD risk factors:           0.00  Established CAD:                            0.00  > or = to 2 anginal events in the past 24 hours: 0.00  Use of ASA in past 7 days:              1.00  Elevated Enzymes:                         0.00  ST Depression > or = to 0.05 mV:  0.00  JESUS score: 1        ED Course as of 06/09/23 1253   Fri Jun 09, 2023   1046 WBC: 6.90 [DP]   1046 Hemoglobin(!): 13.0 [DP]   1046 Hematocrit(!): 38.1 [DP]   1047 Platelets: 291 [DP]   1133 Magnesium: 1.8 [DP]   1133 NT-proBNP: 83 [DP]   1133 Lipase: 111 [DP]   1133 Sodium: 143 [DP]   1133 Potassium: 3.5 [DP]   1134 BUN: 12 [DP]   1134 Creatinine: 0.82 [DP]   1134 ALT: 19 [DP]   1134 AST: 17 [DP]   1215 Troponin I High Sensitivity: 45.3 [DP]      ED Course User Index  [DP] Dolly Michaels NP                Clinical Impression:   Final diagnoses:  [R07.9] Chest pain, unspecified type (Primary)  [Z92.29] Hx of long term use of blood thinners        ED Disposition Condition     Discharge Stable          ED Prescriptions       Medication Sig Dispense Start Date End Date Auth. Provider    pantoprazole (PROTONIX) 40 MG tablet Take 1 tablet (40 mg total) by mouth once daily. 30 tablet 6/9/2023 7/9/2023 Dolly Michaels NP          Follow-up Information       Follow up With Specialties Details Why Contact Info    Gilmar Harris MD Family Medicine In 3 days  [address] [facility]  [facility] [place] [address]  [phone]      [facility] - Emergency Department Emergency Medicine  If symptoms worsen [address] [facility] [id number] [phone]             Dolly Michaels NP  06/09/23 1252       Dolly Michaels NP  06/09/23 1256

## 2023-06-09 NOTE — DISCHARGE INSTRUCTIONS
Take Protonix daily as prescribed. You will need to follow-up with your PCP for refills. Continue your home medications. Follow-up with your PCP on Monday for recheck. Return to the ED for new or worsening medications.

## 2023-06-09 NOTE — PROGRESS NOTES
Gilmar Harris MD   [facility]  MEDICAL GROUP [address] [address]  [phone]      PATIENT NAME: Gm Babcock  : 1970  DATE: 23  MRN: [id number]      Billing Provider: Gilmar Harris MD  Level of Service:   Patient PCP Information       Provider PCP Type    Gilmar Harris MD General            Reason for Visit / Chief Complaint: Chest Pain (X 2 days/Hands hurt, legs shake) and Medication Refill       Update PCP  Update Chief Complaint         History of Present Illness / Problem Focused Workflow     Gm Babcock presents to the clinic with Chest Pain (X 2 days/Hands hurt, legs shake) and Medication Refill       Needs meds refilled.  Here with sister who is caretaker.    Review of Systems     Review of Systems   Respiratory:  Positive for chest tightness. Negative for cough.    Musculoskeletal:  Positive for gait problem and myalgias. Negative for arthralgias.   Neurological:  Positive for speech difficulty, weakness, coordination difficulties and coordination difficulties.      Medical / Social / Family History     Past Medical History:   Diagnosis Date    Myocardial infarction     Seizures     Stroke        History reviewed. No pertinent surgical history.    Social History    reports that he has quit smoking. He has never used smokeless tobacco. He reports current alcohol use.   Social History     Tobacco Use    Smoking status: Former    Smokeless tobacco: Never   Substance Use Topics    Alcohol use: Yes       Family History  Family History   Problem Relation Age of Onset    Coronary artery disease Mother        Medications and Allergies     Medications  Outpatient Medications Marked as Taking for the 23 encounter (Office Visit) with Gilmar Harris MD   Medication Sig Dispense Refill    aspirin (ECOTRIN) 81 MG EC tablet Take 81 mg by mouth once daily.      [DISCONTINUED] apixaban (ELIQUIS) 5 mg  Tab Take 1 tablet (5 mg total) by mouth 2 (two) times daily. 180 tablet 1    [DISCONTINUED] atorvastatin (LIPITOR) 40 MG tablet Take 40 mg by mouth once daily.      [DISCONTINUED] lisinopriL (PRINIVIL,ZESTRIL) 20 MG tablet Take 1 tablet (20 mg total) by mouth once daily. 90 tablet 1       Allergies  Review of patient's allergies indicates:  No Known Allergies    Physical Examination     Vitals:    06/09/23 0943   BP: 133/84   Pulse: 66     Physical Exam  Vitals reviewed.   HENT:      Head: Normocephalic and atraumatic.   Eyes:      Extraocular Movements: Extraocular movements intact.      Conjunctiva/sclera: Conjunctivae normal.      Pupils: Pupils are equal, round, and reactive to light.   Cardiovascular:      Rate and Rhythm: Normal rate and regular rhythm.      Heart sounds: Normal heart sounds.   Pulmonary:      Effort: Pulmonary effort is normal.      Breath sounds: Normal breath sounds.   Musculoskeletal:         General: Tenderness (TTP chest bilaterally) present.      Cervical back: Normal range of motion and neck supple.      Comments: Contracture of RUE   Skin:     General: Skin is warm and dry.   Neurological:      Mental Status: He is alert. Mental status is at baseline.      Motor: Weakness (RUE and RLE) present.      Gait: Gait abnormal (WC bound).   Psychiatric:         Mood and Affect: Mood normal.         Behavior: Behavior normal.        Assessment and Plan (including Health Maintenance)      Problem List  Smart Sets  Document Outside HM   :    Plan:         Health Maintenance Due   Topic Date Due    Hepatitis C Screening  Never done    Pneumococcal Vaccines (Age 0-64) (1 - PCV) Never done    HIV Screening  Never done    Colorectal Cancer Screening  Never done    COVID-19 Vaccine (2 - Moderna series) 11/18/2021       Problem List Items Addressed This Visit          Neuro    History of cardioembolic cerebrovascular accident (CVA) (Chronic)    Relevant Medications    apixaban (ELIQUIS) 5 mg  Tab    Other Relevant Orders    Ambulatory referral/consult to Neurology    Right hemiplegia (Chronic)    Relevant Orders    Ambulatory referral/consult to Neurology    Expressive aphasia - Primary (Chronic)       Cardiac/Vascular    Primary hypertension (Chronic)    Relevant Medications    lisinopriL (PRINIVIL,ZESTRIL) 20 MG tablet    Mixed hyperlipidemia (Chronic)    Relevant Medications    atorvastatin (LIPITOR) 40 MG tablet     Other Visit Diagnoses       Screen for colon cancer        Relevant Orders    Ambulatory referral/consult to Gastroenterology            Health Maintenance Topics with due status: Not Due       Topic Last Completion Date    Lipid Panel 07/31/2022    High Dose Statin 10/14/2022    Influenza Vaccine Not Due       No future appointments.         Signature:  MD RAC[facility]  MEDICAL GROUP [facility] - FAMILY MEDICINE  [address] [place] [id number]  [phone]    Date of encounter: 6/9/23

## 2023-06-14 ENCOUNTER — OFFICE VISIT (OUTPATIENT)
Dept: FAMILY MEDICINE | Facility: CLINIC | Age: 53
End: 2023-06-14
Payer: MEDICAID

## 2023-06-14 VITALS
HEART RATE: 95 BPM | BODY MASS INDEX: 23.99 KG/M2 | SYSTOLIC BLOOD PRESSURE: 104 MMHG | HEIGHT: 69 IN | WEIGHT: 162 LBS | DIASTOLIC BLOOD PRESSURE: 75 MMHG

## 2023-06-14 DIAGNOSIS — I10 PRIMARY HYPERTENSION: Chronic | ICD-10-CM

## 2023-06-14 DIAGNOSIS — I25.2 HISTORY OF MI (MYOCARDIAL INFARCTION): Chronic | ICD-10-CM

## 2023-06-14 DIAGNOSIS — R47.01 EXPRESSIVE APHASIA: Chronic | ICD-10-CM

## 2023-06-14 DIAGNOSIS — G81.91 RIGHT HEMIPLEGIA: Chronic | ICD-10-CM

## 2023-06-14 DIAGNOSIS — I25.10 CORONARY ARTERY DISEASE, UNSPECIFIED VESSEL OR LESION TYPE, UNSPECIFIED WHETHER ANGINA PRESENT, UNSPECIFIED WHETHER NATIVE OR TRANSPLANTED HEART: Chronic | ICD-10-CM

## 2023-06-14 DIAGNOSIS — Z86.73 HISTORY OF CARDIOEMBOLIC CEREBROVASCULAR ACCIDENT (CVA): Primary | Chronic | ICD-10-CM

## 2023-06-14 PROCEDURE — 3008F PR BODY MASS INDEX (BMI) DOCUMENTED: ICD-10-PCS | Mod: CPTII,,, | Performed by: FAMILY MEDICINE

## 2023-06-14 PROCEDURE — 3078F DIAST BP <80 MM HG: CPT | Mod: CPTII,,, | Performed by: FAMILY MEDICINE

## 2023-06-14 PROCEDURE — 3074F PR MOST RECENT SYSTOLIC BLOOD PRESSURE < 130 MM HG: ICD-10-PCS | Mod: CPTII,,, | Performed by: FAMILY MEDICINE

## 2023-06-14 PROCEDURE — 1159F MED LIST DOCD IN RCRD: CPT | Mod: CPTII,,, | Performed by: FAMILY MEDICINE

## 2023-06-14 PROCEDURE — 4010F ACE/ARB THERAPY RXD/TAKEN: CPT | Mod: CPTII,,, | Performed by: FAMILY MEDICINE

## 2023-06-14 PROCEDURE — 1160F PR REVIEW ALL MEDS BY PRESCRIBER/CLIN PHARMACIST DOCUMENTED: ICD-10-PCS | Mod: CPTII,,, | Performed by: FAMILY MEDICINE

## 2023-06-14 PROCEDURE — 99214 PR OFFICE/OUTPT VISIT, EST, LEVL IV, 30-39 MIN: ICD-10-PCS | Mod: ,,, | Performed by: FAMILY MEDICINE

## 2023-06-14 PROCEDURE — 1160F RVW MEDS BY RX/DR IN RCRD: CPT | Mod: CPTII,,, | Performed by: FAMILY MEDICINE

## 2023-06-14 PROCEDURE — 3078F PR MOST RECENT DIASTOLIC BLOOD PRESSURE < 80 MM HG: ICD-10-PCS | Mod: CPTII,,, | Performed by: FAMILY MEDICINE

## 2023-06-14 PROCEDURE — 3008F BODY MASS INDEX DOCD: CPT | Mod: CPTII,,, | Performed by: FAMILY MEDICINE

## 2023-06-14 PROCEDURE — 4010F PR ACE/ARB THEARPY RXD/TAKEN: ICD-10-PCS | Mod: CPTII,,, | Performed by: FAMILY MEDICINE

## 2023-06-14 PROCEDURE — 99214 OFFICE O/P EST MOD 30 MIN: CPT | Mod: ,,, | Performed by: FAMILY MEDICINE

## 2023-06-14 PROCEDURE — 3074F SYST BP LT 130 MM HG: CPT | Mod: CPTII,,, | Performed by: FAMILY MEDICINE

## 2023-06-14 PROCEDURE — 1159F PR MEDICATION LIST DOCUMENTED IN MEDICAL RECORD: ICD-10-PCS | Mod: CPTII,,, | Performed by: FAMILY MEDICINE

## 2023-06-14 NOTE — PROGRESS NOTES
Gilmar Harris MD   [facility]  MEDICAL GROUP [address] [address]  [phone]      PATIENT NAME: Gm Babcock  : 1970  DATE: 23  MRN: [id number]      Billing Provider: Gilmar Harris MD  Level of Service:   Patient PCP Information       Provider PCP Type    Gilmar Harris MD General            Reason for Visit / Chief Complaint: Health Maintenance (Follow-up from ER visit on 2023 at [facility] ER for chest pain. )       Update PCP  Update Chief Complaint         History of Present Illness / Problem Focused Workflow     Gm Babcock presents to the clinic with Health Maintenance (Follow-up from ER visit on 2023 at [facility] ER for chest pain. )       Cardiac workup at ED was negative.  He was started on protonix and has been taking.  Is still having some chest discomfort, which seems to be more musculoskeletal than anything else.  I have discussed getting him started on some home PT and he and his sister are agreeable to this.      I have reviewed his recent ED note and associated labs and test results.      Review of Systems     Review of Systems   Respiratory:  Positive for chest tightness. Negative for cough.    Musculoskeletal:  Positive for gait problem. Negative for arthralgias.   Neurological:  Positive for speech difficulty, weakness, coordination difficulties and coordination difficulties.      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 No pertinent surgical history.    Social History    reports that he has quit smoking. He has never been exposed to tobacco smoke. He has never used smokeless tobacco. He reports that he does not currently use alcohol.   Social History     Tobacco Use    Smoking status: Former     Passive exposure: Never     Smokeless tobacco: Never   Substance Use Topics    Alcohol use: Not Currently       Family History  Family History   Problem Relation Age of Onset    Coronary artery disease Mother        Medications and Allergies     Medications  Outpatient Medications Marked as Taking for the 23 encounter (Office Visit) with Gilmar Harris MD   Medication Sig Dispense Refill    albuterol-ipratropium (DUO-NEB) 2.5 mg-0.5 mg/3 mL nebulizer solution Take 3 mLs by nebulization every 6 (six) hours as needed for Wheezing. Rescue 75 mL 3    apixaban (ELIQUIS) 5 mg Tab Take 1 tablet (5 mg total) by mouth 2 (two) times daily. 180 tablet 1    aspirin (ECOTRIN) 81 MG EC tablet Take 81 mg by mouth once daily.      atorvastatin (LIPITOR) 40 MG tablet Take 1 tablet (40 mg total) by mouth once daily. 90 tablet 1    lisinopriL (PRINIVIL,ZESTRIL) 20 MG tablet Take 1 tablet (20 mg total) by mouth once daily. 90 tablet 1    pantoprazole (PROTONIX) 40 MG tablet Take 1 tablet (40 mg total) by mouth once daily. 30 tablet 0    [] tiZANidine (ZANAFLEX) 2 MG tablet Take 2 tablets (4 mg total) by mouth every 8 (eight) hours as needed (muscle pain/spasm). 30 tablet 0       Allergies  Review of patient's allergies indicates:  No Known Allergies    Physical Examination     Vitals:    23 1124   BP: 104/75   Pulse: 95     Physical Exam  Vitals reviewed.   HENT:      Head: Normocephalic and atraumatic.   Eyes:      Extraocular Movements: Extraocular movements intact.      Conjunctiva/sclera: Conjunctivae normal.      Pupils: Pupils are equal, round, and reactive to light.   Cardiovascular:      Rate and Rhythm: Normal rate and regular rhythm.      Heart sounds: Normal heart sounds.   Pulmonary:      Effort: Pulmonary effort is normal.      Breath sounds: Normal breath sounds.   Musculoskeletal:      Cervical back: Normal range of motion and neck supple.      Comments: Contracture of RUE   Skin:     General: Skin is warm and dry.    Neurological:      Mental Status: He is alert. Mental status is at baseline.      Motor: Weakness (RUE and RLE) present.      Gait: Gait abnormal (WC bound).   Psychiatric:         Mood and Affect: Mood normal.         Behavior: Behavior normal.      Admission on 06/09/2023, Discharged on 06/09/2023   Component Date Value Ref Range Status    Sodium 06/09/2023 143  136 - 145 mmol/L Final    Potassium 06/09/2023 3.5  3.5 - 5.1 mmol/L Final    Chloride 06/09/2023 104  98 - 107 mmol/L Final    CO2 06/09/2023 30  21 - 32 mmol/L Final    Anion Gap 06/09/2023 13  7 - 16 mmol/L Final    Glucose 06/09/2023 82  74 - 106 mg/dL Final    BUN 06/09/2023 12  7 - 18 mg/dL Final    Creatinine 06/09/2023 0.82  0.70 - 1.30 mg/dL Final    BUN/Creatinine Ratio 06/09/2023 15  6 - 20 Final    Calcium 06/09/2023 8.8  8.5 - 10.1 mg/dL Final    Total Protein 06/09/2023 8.0  6.4 - 8.2 g/dL Final    Albumin 06/09/2023 3.6  3.5 - 5.0 g/dL Final    Globulin 06/09/2023 4.4 (H)  2.0 - 4.0 g/dL Final    A/G Ratio 06/09/2023 0.8   Final    Bilirubin, Total 06/09/2023 0.4  >0.0 - 1.2 mg/dL Final    Alk Phos 06/09/2023 61  45 - 115 U/L Final    ALT 06/09/2023 19  16 - 61 U/L Final    AST 06/09/2023 17  15 - 37 U/L Final    eGFR 06/09/2023 106  >=60 mL/min/1.73m2 Final    Color, UA 06/09/2023 Yellow  Colorless, Straw, Yellow, Light Yellow, Dark Yellow Final    Clarity, UA 06/09/2023 Clear  Clear Final    pH, UA 06/09/2023 7.0  5.0 to 8.0 pH Units Final    Leukocytes, UA 06/09/2023 Negative  Negative Final    Nitrites, UA 06/09/2023 Negative  Negative Final    Protein, UA 06/09/2023 Negative  Negative Final    Glucose, UA 06/09/2023 Negative  Normal mg/dL Final    Ketones, UA 06/09/2023 Negative  Negative mg/dL Final    Urobilinogen, UA 06/09/2023 1.0  0.2, 1.0, Normal mg/dL Final    Bilirubin, UA 06/09/2023 Negative  Negative Final    Blood, UA 06/09/2023 Negative  Negative Final    Specific Gravity, UA 06/09/2023 1.020  <=1.005, 1.010, 1.015, 1.020,  1.025, 1.030 Final    Barbiturates, Urine 06/09/2023 Negative  Negative Final    Benzodiazepine, Urine 06/09/2023 Negative  Negative Final    Opiates, Urine 06/09/2023 Negative  Negative Final    Phencyclidine, Urine 06/09/2023 Negative  Negative Final    Amphetamine, Urine 06/09/2023 Negative  Negative Final    Cannabinoid, Urine 06/09/2023 Negative  Negative Final    Cocaine, Urine 06/09/2023 Negative  Negative Final      The results of screening tests should be considered presumptive. Confirmatory testing is available upon request.    Cutoff Points:  PCP:         25ng/mL  AMPH:        500ng/mL  SOY:        200ng/mL  JOEY:        200ng/mL  THC:         50ng/mL  SANGEETA:         300ng/mL  OPI:         2000ng/mL    ProBNP 06/09/2023 83  1 - 125 pg/mL Final    Lipase 06/09/2023 111  73 - 393 U/L Final    Magnesium 06/09/2023 1.8  1.7 - 2.3 mg/dL Final    Troponin I High Sensitivity 06/09/2023 45.3  <=60.4 pg/mL Final    WBC 06/09/2023 6.90  4.50 - 11.00 K/uL Final    RBC 06/09/2023 4.60  4.60 - 6.20 M/uL Final    Hemoglobin 06/09/2023 13.0 (L)  13.5 - 18.0 g/dL Final    Hematocrit 06/09/2023 38.1 (L)  40.0 - 54.0 % Final    MCV 06/09/2023 82.8  80.0 - 96.0 fL Final    MCH 06/09/2023 28.3  27.0 - 31.0 pg Final    MCHC 06/09/2023 34.1  32.0 - 36.0 g/dL Final    RDW 06/09/2023 14.4  11.5 - 14.5 % Final    Platelet Count 06/09/2023 291  150 - 400 K/uL Final    MPV 06/09/2023 10.1  9.4 - 12.4 fL Final    Neutrophils % 06/09/2023 54.5  53.0 - 65.0 % Final    Lymphocytes % 06/09/2023 31.9  27.0 - 41.0 % Final    Neutrophils, Abs 06/09/2023 3.76  1.80 - 7.70 K/uL Final    Lymphocytes, Absolute 06/09/2023 2.20  1.00 - 4.80 K/uL Final    Diff Type 06/09/2023 Auto   Final    Monocytes % 06/09/2023 8.7 (H)  2.0 - 6.0 % Final    Eosinophils % 06/09/2023 4.3 (H)  1.0 - 4.0 % Final    Basophils % 06/09/2023 0.6  0.0 - 1.0 % Final    Monocytes, Absolute 06/09/2023 0.60  0.00 - 0.80 K/uL Final    Eosinophils, Absolute 06/09/2023 0.30   0.00 - 0.50 K/uL Final    Basophils, Absolute 06/09/2023 0.04  0.00 - 0.20 K/uL Final    TSH 06/09/2023 2.040  0.358 - 3.740 uIU/mL Final     X-Ray Chest AP Portable  Narrative: EXAMINATION:  XR CHEST AP PORTABLE    CLINICAL HISTORY:  Chest Pain;    TECHNIQUE:  Single frontal view of the chest was performed.    COMPARISON:  9/9/2019    FINDINGS:  There are lower lung volumes with vascular crowding.  No focal infiltrate.  No pneumothorax.  No pleural effusion.  Impression: Low lung volumes with vascular crowding.  No focal infiltrate.    Electronically signed by: Jovani Scruggs  Date:    06/09/2023  Time:    10:52    Test Reason : R07.9,     Vent. Rate : 064 BPM     Atrial Rate : 064 BPM      P-R Int : 168 ms          QRS Dur : 074 ms       QT Int : 386 ms       P-R-T Axes : 039 043 066 degrees      QTc Int : 398 ms     Normal sinus rhythm   Nonspecific T wave abnormality   Abnormal ECG   When compared with ECG of 15-APR-2021 19:29,   PREVIOUS ECG IS PRESENT   Confirmed by Caleb CAMARGO, Juan R MOY ([id number]) on 6/14/2023 7:24:05 PM     Referred By:  ROB           Confirmed By:Juan R Ellis MD      Specimen Collected: 06/09/23 10:30 Last Resulted: 06/14/23 19:24          Assessment and Plan (including Health Maintenance)      Problem List  Smart Sets  Document Outside HM   :    Plan:         Health Maintenance Due   Topic Date Due    Hepatitis C Screening  Never done    Pneumococcal Vaccines (Age 0-64) (1 - PCV) Never done    HIV Screening  Never done    TETANUS VACCINE  Never done    Colorectal Cancer Screening  Never done    Shingles Vaccine (1 of 2) Never done    COVID-19 Vaccine (2 - Moderna series) 11/18/2021       Problem List Items Addressed This Visit          Neuro    History of cardioembolic cerebrovascular accident (CVA) - Primary (Chronic)    Relevant Orders    Ambulatory referral/consult to Physical/Occupational Therapy    Right hemiplegia (Chronic)    Relevant Orders    Ambulatory referral/consult to  Physical/Occupational Therapy    Expressive aphasia (Chronic)       Cardiac/Vascular    Primary hypertension (Chronic)    Coronary artery disease (Chronic)    History of MI (myocardial infarction) (Chronic)       Health Maintenance Topics with due status: Not Due       Topic Last Completion Date    Lipid Panel 07/31/2022    High Dose Statin 06/14/2023    Influenza Vaccine Not Due       Future Appointments   Date Time Provider Department Center   12/6/2023  9:30 AM Gilmar Harris MD [facility]            Signature:  [facility]  MEDICAL GROUP [facility]IC[facility]  [address] [place] [id number]  [phone]    Date of encounter: 6/14/23

## 2024-01-01 NOTE — ED PROVIDER NOTES
Encounter Date: 7/31/2022       History     Chief Complaint   Patient presents with    Cerebrovascular Accident     A 51-year-old male patient with past medical history of stroke which left him with right sided paralysis and aphasia.  The patient is brought to the emergency department because of left facial droop.  The patient symptoms started at 5:00 p.m. on 07/30/2022 with generalized weakness and progressed tonight to have some left facial droop.  The history is very limited because the patient is not communicating although he understood the questions.  There is no reported fever or chills.  There is no reported difficulty in breathing.  There is no nausea or vomiting.    The history is provided by a relative. No  was used.     Review of patient's allergies indicates:  No Known Allergies  Past Medical History:   Diagnosis Date    Myocardial infarction     Seizures     Stroke      History reviewed. No pertinent surgical history.  Family History   Problem Relation Age of Onset    Coronary artery disease Mother      Social History     Tobacco Use    Smoking status: Former Smoker    Smokeless tobacco: Never Used   Substance Use Topics    Alcohol use: Yes     Review of Systems   Unable to perform ROS: Other (The patient is aphasic)       Physical Exam     Initial Vitals [07/31/22 2250]   BP Pulse Resp Temp SpO2   115/82 90 16 98.9 °F (37.2 °C) 97 %      MAP       --         Physical Exam    Nursing note and vitals reviewed.  Constitutional: He appears well-developed and well-nourished.   HENT:   Head: Normocephalic and atraumatic.   Eyes: EOM are normal. Pupils are equal, round, and reactive to light.   Neck: Neck supple. No thyromegaly present.   Normal range of motion.  Cardiovascular: Normal rate, regular rhythm, normal heart sounds and intact distal pulses.   No murmur heard.  Pulmonary/Chest: Breath sounds normal. No respiratory distress. He has no wheezes.   Abdominal: Abdomen is  soft. Bowel sounds are normal. There is no abdominal tenderness.   Musculoskeletal:         General: No tenderness or edema. Normal range of motion.      Cervical back: Normal range of motion and neck supple.     Lymphadenopathy:     He has no cervical adenopathy.   Neurological: He is alert and oriented to person, place, and time. He has normal strength and normal reflexes. No cranial nerve deficit or sensory deficit. GCS score is 15. GCS eye subscore is 4. GCS verbal subscore is 5. GCS motor subscore is 6.   The patient has right side paralysis of the upper and lower extremities.   Skin: Skin is warm and dry. Capillary refill takes less than 2 seconds. No rash noted.   Psychiatric: He has a normal mood and affect.         Medical Screening Exam   See Full Note    ED Course   Procedures  Labs Reviewed   COMPREHENSIVE METABOLIC PANEL - Abnormal; Notable for the following components:       Result Value    Total Protein 8.8 (*)     Globulin 4.7 (*)     ALT 14 (*)     AST 14 (*)     All other components within normal limits   TSH - Abnormal; Notable for the following components:    TSH 3.810 (*)     All other components within normal limits   LIPID PANEL - Abnormal; Notable for the following components:    Cholesterol 206 (*)     All other components within normal limits   CBC WITH DIFFERENTIAL - Abnormal; Notable for the following components:    Neutrophils % 49.2 (*)     Monocytes % 7.5 (*)     All other components within normal limits   PROTIME-INR - Normal   CBC W/ AUTO DIFFERENTIAL    Narrative:     The following orders were created for panel order CBC W/ AUTO DIFFERENTIAL.  Procedure                               Abnormality         Status                     ---------                               -----------         ------                     CBC with Differential[[id number]]        Abnormal            Final result                 Please view results for these tests on the individual orders.          Imaging  Results          CT Head Without Contrast (Final result)  Result time 08/01/22 07:31:49    Final result by Dayo Hunt DO (08/01/22 07:31:49)                 Impression:      No convincing imaging evidence of acute intracranial abnormality.  Old large left-sided cerebral infarct.  Small old infarcts within the bilateral cerebellum.    Preliminary report was issued by [facility].    The CT exam was performed using one or more of the following dose    reduction techniques- Automated exposure control, adjustment of the mA    and/or kV according to patient size, and/or use of iterative    reconstructed technique.    Point of Service: [facility]      Electronically signed by: Dayo Hunt  Date:    08/01/2022  Time:    07:31             Narrative:    EXAMINATION:  CT HEAD WITHOUT CONTRAST    CLINICAL HISTORY:  Neuro deficit, acute, stroke suspected;    COMPARISON:  CT brain September 18, 2019    TECHNIQUE:  Multiple axial tomographic images of the brain were obtained without the use of intravenous contrast.    FINDINGS:  Midline structures are nondisplaced.  No convincing evidence of acute intracranial hemorrhage.  No convincing evidence of hydrocephalus.  Large old left-sided cerebral infarct with tissue calcification appears similar to prior examination.  Small old infarcts within the bilateral cerebellum.  Visualized paranasal sinuses and mastoid air cells are predominantly clear.                              X-Rays:   Independently Interpreted Readings:   Other Readings:  CT head without contrast read by [facility] showed no acute findings.  There is a large old left cerebral infarct    Medications - No data to display                    Clinical Impression:   Final diagnoses:  [I63.9] Stroke  [E03.8] Other specified hypothyroidism (Primary)          ED Disposition Condition    Discharge Stable        ED Prescriptions     None        Follow-up Information     Follow up With  Specialties Details Why Contact Info    Gilmar Harris MD Family Medicine In 3 days As needed [address] [facility]  [facility] [place] [id number] [phone]             René Goodson MD  08/13/22 7436     2

## 2024-02-08 ENCOUNTER — OFFICE VISIT (OUTPATIENT)
Dept: FAMILY MEDICINE | Facility: CLINIC | Age: 54
End: 2024-02-08
Payer: MEDICAID

## 2024-02-08 VITALS
BODY MASS INDEX: 23.99 KG/M2 | HEIGHT: 69 IN | SYSTOLIC BLOOD PRESSURE: 106 MMHG | WEIGHT: 162 LBS | HEART RATE: 85 BPM | DIASTOLIC BLOOD PRESSURE: 70 MMHG

## 2024-02-08 DIAGNOSIS — E78.2 MIXED HYPERLIPIDEMIA: Chronic | ICD-10-CM

## 2024-02-08 DIAGNOSIS — Z86.73 HISTORY OF CARDIOEMBOLIC CEREBROVASCULAR ACCIDENT (CVA): Chronic | ICD-10-CM

## 2024-02-08 DIAGNOSIS — K21.9 GASTROESOPHAGEAL REFLUX DISEASE WITHOUT ESOPHAGITIS: Chronic | ICD-10-CM

## 2024-02-08 DIAGNOSIS — R47.01 EXPRESSIVE APHASIA: Chronic | ICD-10-CM

## 2024-02-08 DIAGNOSIS — I10 PRIMARY HYPERTENSION: Primary | Chronic | ICD-10-CM

## 2024-02-08 DIAGNOSIS — Z99.3 WHEELCHAIR DEPENDENCE: Chronic | ICD-10-CM

## 2024-02-08 DIAGNOSIS — R05.1 ACUTE COUGH: ICD-10-CM

## 2024-02-08 DIAGNOSIS — G81.91 RIGHT HEMIPLEGIA: Chronic | ICD-10-CM

## 2024-02-08 PROCEDURE — 4010F ACE/ARB THERAPY RXD/TAKEN: CPT | Mod: CPTII,,, | Performed by: FAMILY MEDICINE

## 2024-02-08 PROCEDURE — 3074F SYST BP LT 130 MM HG: CPT | Mod: CPTII,,, | Performed by: FAMILY MEDICINE

## 2024-02-08 PROCEDURE — 3078F DIAST BP <80 MM HG: CPT | Mod: CPTII,,, | Performed by: FAMILY MEDICINE

## 2024-02-08 PROCEDURE — 1159F MED LIST DOCD IN RCRD: CPT | Mod: CPTII,,, | Performed by: FAMILY MEDICINE

## 2024-02-08 PROCEDURE — 3008F BODY MASS INDEX DOCD: CPT | Mod: CPTII,,, | Performed by: FAMILY MEDICINE

## 2024-02-08 PROCEDURE — 1160F RVW MEDS BY RX/DR IN RCRD: CPT | Mod: CPTII,,, | Performed by: FAMILY MEDICINE

## 2024-02-08 PROCEDURE — 99214 OFFICE O/P EST MOD 30 MIN: CPT | Mod: ,,, | Performed by: FAMILY MEDICINE

## 2024-02-08 RX ORDER — GUAIFENESIN 400 MG/1
400 TABLET ORAL EVERY 6 HOURS PRN
Qty: 30 TABLET | Refills: 0 | Status: SHIPPED | OUTPATIENT
Start: 2024-02-08

## 2024-02-08 RX ORDER — ATORVASTATIN CALCIUM 40 MG/1
40 TABLET, FILM COATED ORAL DAILY
Qty: 90 TABLET | Refills: 3 | Status: SHIPPED | OUTPATIENT
Start: 2024-02-08

## 2024-02-08 RX ORDER — PANTOPRAZOLE SODIUM 40 MG/1
40 TABLET, DELAYED RELEASE ORAL DAILY
Qty: 90 TABLET | Refills: 1 | Status: SHIPPED | OUTPATIENT
Start: 2024-02-08 | End: 2024-02-08

## 2024-02-08 RX ORDER — PANTOPRAZOLE SODIUM 40 MG/1
40 TABLET, DELAYED RELEASE ORAL DAILY
Qty: 90 TABLET | Refills: 1 | Status: SHIPPED | OUTPATIENT
Start: 2024-02-08

## 2024-02-08 RX ORDER — LISINOPRIL 20 MG/1
20 TABLET ORAL DAILY
Qty: 90 TABLET | Refills: 1 | Status: SHIPPED | OUTPATIENT
Start: 2024-02-08

## 2024-02-08 NOTE — PROGRESS NOTES
Gilmar Harris MD   [facility]  [facility]ICAL [facility] [address] [address]  [phone]      PATIENT NAME: Gm Babcock  : 1970  DATE: 24  MRN: [id number]      Billing Provider: Gilmar Harris MD  Level of Service:   Patient PCP Information       Provider PCP Type    Gilmar Harris MD General            Reason for Visit / Chief Complaint: Medication Refill       Update PCP  Update Chief Complaint         History of Present Illness / Problem Focused Workflow     Gm Babcock presents to the clinic with Medication Refill       52 yo AAM with h/o HTN, hyperlpidemia, CAD, MI and CVA.  Has right sided hemiplegia and expressive aphasia.  He needs a couple of meds refilled today.  Has been having a cough for the past few days per caretaker.         Medication Refill  Associated symptoms include coughing and weakness. Pertinent negatives include no arthralgias, chills or fever.       Review of Systems     Review of Systems   Constitutional:  Negative for chills and fever.   Respiratory:  Positive for cough.    Musculoskeletal:  Positive for gait problem. Negative for arthralgias.   Neurological:  Positive for speech difficulty, weakness and coordination difficulties.        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 No pertinent surgical history.    Social History    reports that he has quit smoking. He has never been exposed to tobacco smoke. He has never used smokeless tobacco. He reports that he does not currently use alcohol.   Social History     Tobacco Use    Smoking status: Former     Passive exposure: Never    Smokeless tobacco: Never   Substance Use Topics    Alcohol use: Not Currently       Family History  Family History   Problem Relation Age of Onset     Coronary artery disease Mother        Medications and Allergies     Medications  Outpatient Medications Marked as Taking for the 2/8/24 encounter (Office Visit) with Gilmar Harris MD   Medication Sig Dispense Refill    [DISCONTINUED] apixaban (ELIQUIS) 5 mg Tab Take 1 tablet (5 mg total) by mouth 2 (two) times daily. 180 tablet 1    [DISCONTINUED] atorvastatin (LIPITOR) 40 MG tablet Take 1 tablet (40 mg total) by mouth once daily. 90 tablet 1    [DISCONTINUED] lisinopriL (PRINIVIL,ZESTRIL) 20 MG tablet Take 1 tablet (20 mg total) by mouth once daily. 90 tablet 1       Allergies  Review of patient's allergies indicates:  No Known Allergies    Physical Examination     Vitals:    02/08/24 1016   BP: 106/70   Pulse: 85     Physical Exam  Vitals reviewed.   HENT:      Head: Normocephalic and atraumatic.   Eyes:      Extraocular Movements: Extraocular movements intact.      Conjunctiva/sclera: Conjunctivae normal.      Pupils: Pupils are equal, round, and reactive to light.   Cardiovascular:      Rate and Rhythm: Normal rate and regular rhythm.      Heart sounds: Normal heart sounds.   Pulmonary:      Effort: Pulmonary effort is normal.      Breath sounds: Normal breath sounds.   Musculoskeletal:      Cervical back: Normal range of motion and neck supple.      Comments: Contracture of RUE   Skin:     General: Skin is warm and dry.   Neurological:      Mental Status: He is alert. Mental status is at baseline.      Motor: Weakness (RUE and RLE) present.      Gait: Gait abnormal (WC bound).   Psychiatric:         Mood and Affect: Mood normal.         Behavior: Behavior normal.          Assessment and Plan (including Health Maintenance)      Problem List  Smart Sets  Document Outside HM   :    Plan:         Health Maintenance Due   Topic Date Due    Hepatitis C Screening  Never done    Pneumococcal Vaccines (Age 0-64) (1 of 2 - PCV) Never done    HIV Screening  Never done    TETANUS VACCINE  Never done    Colorectal  Cancer Screening  Never done    Shingles Vaccine (1 of 2) Never done    Influenza Vaccine (1) Never done    COVID-19 Vaccine (2 - 2023-24 season) 09/01/2023       Problem List Items Addressed This Visit          Neuro    History of cardioembolic cerebrovascular accident (CVA) (Chronic)    Relevant Medications    apixaban (ELIQUIS) 5 mg Tab    Right hemiplegia (Chronic)    Expressive aphasia (Chronic)       Cardiac/Vascular    Primary hypertension - Primary (Chronic)    Relevant Medications    lisinopriL (PRINIVIL,ZESTRIL) 20 MG tablet    Mixed hyperlipidemia (Chronic)    Relevant Medications    atorvastatin (LIPITOR) 40 MG tablet       GI    Gastroesophageal reflux disease without esophagitis (Chronic)    Relevant Medications    pantoprazole (PROTONIX) 40 MG tablet     Other Visit Diagnoses       Acute cough        Relevant Medications    guaiFENesin (HUMIBID E) 400 mg Tab    Wheelchair dependence  (Chronic)               Health Maintenance Topics with due status: Not Due       Topic Last Completion Date    Lipid Panel 07/31/2022    High Dose Statin 02/08/2024       Future Appointments   Date Time Provider Department Center   7/9/2024 10:30 AM Gilmar Harris MD [facility]            Signature:  [facility]  MEDICAL GROUP [facility]  [address] [place] [address] [place] [address]  [phone]    Date of encounter: 2/8/24

## 2024-04-17 ENCOUNTER — HOSPITAL ENCOUNTER (EMERGENCY)
Facility: HOSPITAL | Age: 54
Discharge: HOME | End: 2024-04-17
Attending: EMERGENCY MEDICINE
Payer: MEDICAID

## 2024-04-17 VITALS
TEMPERATURE: 98 F | OXYGEN SATURATION: 99 % | RESPIRATION RATE: 15 BRPM | HEIGHT: 69 IN | DIASTOLIC BLOOD PRESSURE: 83 MMHG | WEIGHT: 162 LBS | HEART RATE: 69 BPM | BODY MASS INDEX: 23.99 KG/M2 | SYSTOLIC BLOOD PRESSURE: 125 MMHG

## 2024-04-17 DIAGNOSIS — N39.0 URINARY TRACT INFECTION WITHOUT HEMATURIA, SITE UNSPECIFIED: Primary | ICD-10-CM

## 2024-04-17 DIAGNOSIS — R07.9 CHEST PAIN: ICD-10-CM

## 2024-04-17 DIAGNOSIS — E86.0 DEHYDRATION: ICD-10-CM

## 2024-04-17 LAB
ALBUMIN SERPL BCP-MCNC: 3.6 G/DL (ref 3.5–5)
ALBUMIN/GLOB SERPL: 0.7 {RATIO}
ALP SERPL-CCNC: 65 U/L (ref 45–115)
ALT SERPL W P-5'-P-CCNC: 24 U/L (ref 16–61)
ANION GAP SERPL CALCULATED.3IONS-SCNC: 10 MMOL/L (ref 7–16)
AST SERPL W P-5'-P-CCNC: 16 U/L (ref 15–37)
BACTERIA #/AREA URNS HPF: ABNORMAL /HPF
BASOPHILS # BLD AUTO: 0.03 K/UL (ref 0–0.2)
BASOPHILS NFR BLD AUTO: 0.3 % (ref 0–1)
BILIRUB SERPL-MCNC: 0.4 MG/DL (ref ?–1.2)
BILIRUB UR QL STRIP: NEGATIVE
BUN SERPL-MCNC: 24 MG/DL (ref 7–18)
BUN/CREAT SERPL: 24 (ref 6–20)
CALCIUM SERPL-MCNC: 8.9 MG/DL (ref 8.5–10.1)
CHLORIDE SERPL-SCNC: 105 MMOL/L (ref 98–107)
CLARITY UR: ABNORMAL
CO2 SERPL-SCNC: 30 MMOL/L (ref 21–32)
COLOR UR: YELLOW
CREAT SERPL-MCNC: 0.99 MG/DL (ref 0.7–1.3)
DIFFERENTIAL METHOD BLD: ABNORMAL
EGFR (NO RACE VARIABLE) (RUSH/TITUS): 91 ML/MIN/1.73M2
EOSINOPHIL # BLD AUTO: 0.29 K/UL (ref 0–0.5)
EOSINOPHIL NFR BLD AUTO: 3 % (ref 1–4)
ERYTHROCYTE [DISTWIDTH] IN BLOOD BY AUTOMATED COUNT: 14 % (ref 11.5–14.5)
GLOBULIN SER-MCNC: 4.9 G/DL (ref 2–4)
GLUCOSE SERPL-MCNC: 88 MG/DL (ref 74–106)
GLUCOSE UR STRIP-MCNC: NORMAL MG/DL
HCT VFR BLD AUTO: 40.4 % (ref 40–54)
HGB BLD-MCNC: 12.7 G/DL (ref 13.5–18)
IMM GRANULOCYTES # BLD AUTO: 0.03 K/UL (ref 0–0.04)
IMM GRANULOCYTES NFR BLD: 0.3 % (ref 0–0.4)
KETONES UR STRIP-SCNC: NEGATIVE MG/DL
LEUKOCYTE ESTERASE UR QL STRIP: ABNORMAL
LYMPHOCYTES # BLD AUTO: 2.5 K/UL (ref 1–4.8)
LYMPHOCYTES NFR BLD AUTO: 26.2 % (ref 27–41)
MCH RBC QN AUTO: 27.1 PG (ref 27–31)
MCHC RBC AUTO-ENTMCNC: 31.4 G/DL (ref 32–36)
MCV RBC AUTO: 86.1 FL (ref 80–96)
MONOCYTES # BLD AUTO: 0.58 K/UL (ref 0–0.8)
MONOCYTES NFR BLD AUTO: 6.1 % (ref 2–6)
MPC BLD CALC-MCNC: 10.8 FL (ref 9.4–12.4)
MUCOUS, UA: ABNORMAL /LPF
NEUTROPHILS # BLD AUTO: 6.13 K/UL (ref 1.8–7.7)
NEUTROPHILS NFR BLD AUTO: 64.1 % (ref 53–65)
NITRITE UR QL STRIP: NEGATIVE
NRBC # BLD AUTO: 0 X10E3/UL
NRBC, AUTO (.00): 0 %
NT-PROBNP SERPL-MCNC: 37 PG/ML (ref 1–125)
PH UR STRIP: 6.5 PH UNITS
PLATELET # BLD AUTO: 302 K/UL (ref 150–400)
POTASSIUM SERPL-SCNC: 3.7 MMOL/L (ref 3.5–5.1)
PROT SERPL-MCNC: 8.5 G/DL (ref 6.4–8.2)
PROT UR QL STRIP: 20
RBC # BLD AUTO: 4.69 M/UL (ref 4.6–6.2)
RBC # UR STRIP: ABNORMAL /UL
RBC #/AREA URNS HPF: 7 /HPF
SODIUM SERPL-SCNC: 141 MMOL/L (ref 136–145)
SP GR UR STRIP: 1.03
SQUAMOUS #/AREA URNS LPF: ABNORMAL /HPF
TROPONIN I SERPL DL<=0.01 NG/ML-MCNC: 39.9 PG/ML
TROPONIN I SERPL DL<=0.01 NG/ML-MCNC: 41.8 PG/ML
UROBILINOGEN UR STRIP-ACNC: 4 MG/DL
WBC # BLD AUTO: 9.56 K/UL (ref 4.5–11)
WBC #/AREA URNS HPF: >182 /HPF

## 2024-04-17 PROCEDURE — 99284 EMERGENCY DEPT VISIT MOD MDM: CPT | Mod: ,,, | Performed by: EMERGENCY MEDICINE

## 2024-04-17 PROCEDURE — 87086 URINE CULTURE/COLONY COUNT: CPT | Performed by: EMERGENCY MEDICINE

## 2024-04-17 PROCEDURE — 99285 EMERGENCY DEPT VISIT HI MDM: CPT | Mod: 25

## 2024-04-17 PROCEDURE — 83880 ASSAY OF NATRIURETIC PEPTIDE: CPT | Performed by: EMERGENCY MEDICINE

## 2024-04-17 PROCEDURE — 84484 ASSAY OF TROPONIN QUANT: CPT | Performed by: EMERGENCY MEDICINE

## 2024-04-17 PROCEDURE — 96365 THER/PROPH/DIAG IV INF INIT: CPT

## 2024-04-17 PROCEDURE — 93005 ELECTROCARDIOGRAM TRACING: CPT

## 2024-04-17 PROCEDURE — 80053 COMPREHEN METABOLIC PANEL: CPT | Performed by: EMERGENCY MEDICINE

## 2024-04-17 PROCEDURE — 85025 COMPLETE CBC W/AUTO DIFF WBC: CPT | Performed by: EMERGENCY MEDICINE

## 2024-04-17 PROCEDURE — 81003 URINALYSIS AUTO W/O SCOPE: CPT | Performed by: EMERGENCY MEDICINE

## 2024-04-17 PROCEDURE — 25000003 PHARM REV CODE 250: Performed by: EMERGENCY MEDICINE

## 2024-04-17 PROCEDURE — 93010 ELECTROCARDIOGRAM REPORT: CPT | Mod: ,,, | Performed by: INTERNAL MEDICINE

## 2024-04-17 PROCEDURE — 96361 HYDRATE IV INFUSION ADD-ON: CPT

## 2024-04-17 PROCEDURE — 63600175 PHARM REV CODE 636 W HCPCS: Performed by: EMERGENCY MEDICINE

## 2024-04-17 RX ORDER — CEPHALEXIN 500 MG/1
500 CAPSULE ORAL 4 TIMES DAILY
Qty: 20 CAPSULE | Refills: 0 | Status: SHIPPED | OUTPATIENT
Start: 2024-04-17 | End: 2024-04-22

## 2024-04-17 RX ADMIN — SODIUM CHLORIDE 1000 ML: 9 INJECTION, SOLUTION INTRAVENOUS at 07:04

## 2024-04-17 RX ADMIN — CEFTRIAXONE SODIUM 1 G: 1 INJECTION, POWDER, FOR SOLUTION INTRAMUSCULAR; INTRAVENOUS at 09:04

## 2024-04-18 ENCOUNTER — TELEPHONE (OUTPATIENT)
Dept: EMERGENCY MEDICINE | Facility: HOSPITAL | Age: 54
End: 2024-04-18
Payer: MEDICAID

## 2024-04-18 NOTE — ED PROVIDER NOTES
Encounter Date: 4/17/2024    SCRIBE #1 NOTE: I, Hillary Shepherd, am scribing for, and in the presence of,  Salo Preston MD. I have scribed the entire note.       History     Chief Complaint   Patient presents with    Chest Pain     Family states chest pain per patient. Patient unable to speak r/t stroke but shakes head yes when asked if chest is hurting. Hx MI    Facial Swelling     Family states patient's right side of face began swelling today     53 year old male presents to the ED complaining of chest pain and facial swelling. Patient's relative says that she noticed swelling to patient's face last night and says that on today patient reported he was having chest pain. Patient's Hx is limited due to aphasia from previous stroke but patient is able to shake his head when asked questions. Patient says that he has chest pain now and says that he has never had pain like this before. Patient reports CP has been happening for the past 2 days and has pain with breathing. He denies any SOB, nausea, vomiting, diaphoresis, or difficulty urinating. Patient reports that he has been coughing for a while with yellow colored sputum. Patient denies any smoking Hx or alcohol. Patient denies any Hx of MI. Patient has a PMHx of stroke, seizures, and dysphasia.     The history is provided by the patient and a relative. The history is limited by the condition of the patient. No  was used.     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  Family History   Problem Relation Name Age of Onset    Coronary artery disease Mother       Social History     Tobacco Use    Smoking status: Former     Passive exposure: Never    Smokeless tobacco: Never   Substance Use Topics    Alcohol use: Not Currently     Review of  Systems   Constitutional:  Negative for diaphoresis.   Respiratory:  Positive for cough. Negative for shortness of breath.    Cardiovascular:  Positive for chest pain.   Gastrointestinal:  Negative for nausea and vomiting.   Genitourinary:  Negative for difficulty urinating and dysuria.   All other systems reviewed and are negative.      Physical Exam     Initial Vitals [04/17/24 1853]   BP Pulse Resp Temp SpO2   97/67 86 18 98.4 °F (36.9 °C) 96 %      MAP       --         Physical Exam    Nursing note and vitals reviewed.  Cardiovascular:  Regular rhythm and normal heart sounds.           Pulmonary/Chest: Breath sounds normal.   Musculoskeletal:      Comments: Chronic right arm paralysis     Neurological: He is alert.   Limited Hx due to aphasia, patient will answer yes or no to questions         ED Course   Procedures  Labs Reviewed   COMPREHENSIVE METABOLIC PANEL - Abnormal; Notable for the following components:       Result Value    BUN 24 (*)     BUN/Creatinine Ratio 24 (*)     Total Protein 8.5 (*)     Globulin 4.9 (*)     All other components within normal limits   URINALYSIS, REFLEX TO URINE CULTURE - Abnormal; Notable for the following components:    Leukocytes, UA Large (*)     Protein, UA 20 (*)     Urobilinogen, UA 4 (*)     Blood, UA Trace (*)     All other components within normal limits   CBC WITH DIFFERENTIAL - Abnormal; Notable for the following components:    Hemoglobin 12.7 (*)     MCHC 31.4 (*)     Lymphocytes % 26.2 (*)     Monocytes % 6.1 (*)     All other components within normal limits   URINALYSIS, MICROSCOPIC - Abnormal; Notable for the following components:    WBC, UA >182 (*)     RBC, UA 7 (*)     Bacteria, UA Few (*)     Squamous Epithelial Cells, UA Occasional (*)     Mucous Occasional (*)     All other components within normal limits   TROPONIN I - Normal   NT-PRO NATRIURETIC PEPTIDE - Normal   TROPONIN I - Normal   CULTURE, URINE   CBC W/ AUTO DIFFERENTIAL    Narrative:     The  following orders were created for panel order CBC auto differential.  Procedure                               Abnormality         Status                     ---------                               -----------         ------                     CBC with Differential[[id number]]       Abnormal            Final result                 Please view results for these tests on the individual orders.   EXTRA TUBES    Narrative:     The following orders were created for panel order EXTRA TUBES.  Procedure                               Abnormality         Status                     ---------                               -----------         ------                     Light Blue Top Hold[[id number]]                             In process                   Please view results for these tests on the individual orders.   LIGHT BLUE TOP HOLD          Imaging Results              X-Ray Chest AP Portable (Final result)  Result time 04/17/24 19:42:50      Final result by Jovani Scruggs MD (04/17/24 19:42:50)                   Impression:      No acute findings.      Electronically signed by: Jovani Scruggs  Date:    04/17/2024  Time:    19:42               Narrative:    EXAMINATION:  XR CHEST AP PORTABLE    CLINICAL HISTORY:  Chest Pain;    TECHNIQUE:  Single frontal view of the chest was performed.    COMPARISON:  06/09/2023    FINDINGS:  Heart size normal. Lungs clear. No pneumothorax or pleural effusion.                                       Medications   cefTRIAXone (Rocephin) 1 g in dextrose 5 % in water (D5W) 100 mL IVPB (MB+) (has no administration in time range)   sodium chloride 0.9% bolus 1,000 mL 1,000 mL (0 mLs Intravenous Stopped 4/17/24 2023)     Medical Decision Making  Amount and/or Complexity of Data Reviewed  Labs: ordered.  Radiology: ordered.    Risk  Prescription drug management.              Attending Attestation:           Physician Attestation for Scribe:  Physician Attestation Statement for Scribe #1: Salo DOMÍNGUEZ  MD Kary, reviewed documentation, as scribed by Hillary Shepherd in my presence, and it is both accurate and complete.             ED Course as of 04/17/24 2124 Wed Apr 17, 2024 1911 Medical decision-making:  Differential diagnosis includes chest pain, chest wall pain, pneumonia, STEMI, NSTEMI, ACS, abdominal pain, UTI.  All testing ordered and interpreted by me. [BB]   1955 CBC is normal. [BB]   1955 Chest x-ray by my interpretation shows no acute disease. [BB]   2039 Pro BNP is normal.  CMP is unremarkable except elevated BUN to creatinine ratio.  Patient is receiving a L bolus of saline.  Troponin is normal. [BB]   2120 Urinalysis shows UTI with greater than 182 white cells, few bacteria.   [BB]   2120 Repeat troponin is normal and not significantly changed from initial troponin. [BB]   2122 EKG by my interpretation shows sinus rhythm, rate of 81, no acute ST segment changes. [BB]      ED Course User Index  [BB] Salo Preston MD                             Clinical Impression:  Final diagnoses:  [R07.9] Chest pain  [N39.0] Urinary tract infection without hematuria, site unspecified (Primary)  [E86.0] Dehydration          ED Disposition Condition    Discharge Stable          ED Prescriptions       Medication Sig Dispense Start Date End Date Auth. Provider    cephALEXin (KEFLEX) 500 MG capsule Take 1 capsule (500 mg total) by mouth 4 (four) times daily. for 5 days 20 capsule 4/17/2024 4/22/2024 Salo Preston MD          Follow-up Information    None          Salo Preston MD  04/17/24 2124

## 2024-04-19 LAB — UA COMPLETE W REFLEX CULTURE PNL UR: ABNORMAL

## 2024-04-22 LAB
OHS QRS DURATION: 82 MS
OHS QTC CALCULATION: 417 MS

## 2024-07-09 ENCOUNTER — OFFICE VISIT (OUTPATIENT)
Dept: FAMILY MEDICINE | Facility: CLINIC | Age: 54
End: 2024-07-09
Payer: MEDICAID

## 2024-07-09 VITALS
HEIGHT: 69 IN | OXYGEN SATURATION: 99 % | HEART RATE: 75 BPM | WEIGHT: 162.06 LBS | SYSTOLIC BLOOD PRESSURE: 110 MMHG | DIASTOLIC BLOOD PRESSURE: 72 MMHG | BODY MASS INDEX: 24 KG/M2

## 2024-07-09 DIAGNOSIS — K21.9 GASTROESOPHAGEAL REFLUX DISEASE WITHOUT ESOPHAGITIS: Chronic | ICD-10-CM

## 2024-07-09 DIAGNOSIS — Z86.73 HISTORY OF CARDIOEMBOLIC CEREBROVASCULAR ACCIDENT (CVA): Chronic | ICD-10-CM

## 2024-07-09 DIAGNOSIS — Z99.3 WHEELCHAIR DEPENDENT: Chronic | ICD-10-CM

## 2024-07-09 DIAGNOSIS — E78.2 MIXED HYPERLIPIDEMIA: Chronic | ICD-10-CM

## 2024-07-09 DIAGNOSIS — I10 PRIMARY HYPERTENSION: Primary | Chronic | ICD-10-CM

## 2024-07-09 PROCEDURE — 3074F SYST BP LT 130 MM HG: CPT | Mod: CPTII,,, | Performed by: FAMILY MEDICINE

## 2024-07-09 PROCEDURE — 99214 OFFICE O/P EST MOD 30 MIN: CPT | Mod: ,,, | Performed by: FAMILY MEDICINE

## 2024-07-09 PROCEDURE — 3078F DIAST BP <80 MM HG: CPT | Mod: CPTII,,, | Performed by: FAMILY MEDICINE

## 2024-07-09 PROCEDURE — G2211 COMPLEX E/M VISIT ADD ON: HCPCS | Mod: ,,, | Performed by: FAMILY MEDICINE

## 2024-07-09 PROCEDURE — 4010F ACE/ARB THERAPY RXD/TAKEN: CPT | Mod: CPTII,,, | Performed by: FAMILY MEDICINE

## 2024-07-09 PROCEDURE — 1159F MED LIST DOCD IN RCRD: CPT | Mod: CPTII,,, | Performed by: FAMILY MEDICINE

## 2024-07-09 PROCEDURE — 1160F RVW MEDS BY RX/DR IN RCRD: CPT | Mod: CPTII,,, | Performed by: FAMILY MEDICINE

## 2024-07-09 PROCEDURE — 3008F BODY MASS INDEX DOCD: CPT | Mod: CPTII,,, | Performed by: FAMILY MEDICINE

## 2024-07-09 RX ORDER — LISINOPRIL 20 MG/1
20 TABLET ORAL DAILY
Qty: 90 TABLET | Refills: 1 | Status: SHIPPED | OUTPATIENT
Start: 2024-07-09

## 2024-07-09 RX ORDER — ATORVASTATIN CALCIUM 40 MG/1
40 TABLET, FILM COATED ORAL DAILY
Qty: 90 TABLET | Refills: 3 | Status: SHIPPED | OUTPATIENT
Start: 2024-07-09

## 2024-07-09 RX ORDER — PANTOPRAZOLE SODIUM 40 MG/1
40 TABLET, DELAYED RELEASE ORAL DAILY
Qty: 90 TABLET | Refills: 1 | Status: SHIPPED | OUTPATIENT
Start: 2024-07-09

## 2024-07-09 NOTE — PROGRESS NOTES
Gilmar Harris MD   [facility]  MEDICAL GROUP [facility]  [address] [place] [address] [place] [address]  [phone]      PATIENT NAME: Gm Babcock  : 1970  DATE: 24  MRN: [id number]      Billing Provider: Gilmar Harris MD  Level of Service: [place] OFFICE/OUTPT VISIT, EST, LEVL IV, 30-39 MIN  Patient PCP Information       Provider PCP Type    Gilmar Harris MD General            Reason for Visit / Chief Complaint: 6 month follow up and Hypertension       Update PCP  Update Chief Complaint         History of Present Illness / Problem Focused Workflow     Gm Babcock presents to the clinic with 6 month follow up and Hypertension       52 yo AAM with h/o HTN, hyperlpidemia, CAD, MI.  Has right sided hemiplegia and expressive aphasia due to CVA.  Needs med refills today.  No new/acute complaints.    Hypertension        Review of Systems     Review of Systems   Constitutional:  Negative for chills and fever.   Respiratory:  Positive for cough.    Musculoskeletal:  Positive for gait problem. Negative for arthralgias.   Neurological:  Positive for speech difficulty, weakness and coordination difficulties.        Medical / Social / Family History     Past Medical History:   Diagnosis Date    Aphasia as late effect of cerebrovascular accident     Chronic obstructive pulmonary disease 2022    Coronary artery disease 2022    Dysphasia as late effect of cerebrovascular accident (CVA)     Gastroesophageal reflux disease without esophagitis 2024    Hemiplegia of right dominant side as late effect of cerebral infarction, unspecified hemiplegia type     Mixed hyperlipidemia 2022    Primary hypertension 2022    Seizure as late effect of cerebrovascular accident (CVA)        History reviewed. No pertinent surgical history.    Social History    reports that he has quit smoking. He has never been exposed to tobacco smoke. He has never used  smokeless tobacco. He reports that he does not currently use alcohol.   Social History     Tobacco Use    Smoking status: Former     Passive exposure: Never    Smokeless tobacco: Never   Substance Use Topics    Alcohol use: Not Currently       Family History  Family History   Problem Relation Name Age of Onset    Coronary artery disease Mother         Medications and Allergies     Medications  Outpatient Medications Marked as Taking for the 7/9/24 encounter (Office Visit) with Gilmar Harris MD   Medication Sig Dispense Refill    aspirin (ECOTRIN) 81 MG EC tablet Take 81 mg by mouth once daily.      guaiFENesin (HUMIBID E) 400 mg Tab Take 1 tablet (400 mg total) by mouth every 6 (six) hours as needed (cough). 30 tablet 0    [DISCONTINUED] apixaban (ELIQUIS) 5 mg Tab Take 1 tablet (5 mg total) by mouth 2 (two) times daily. 180 tablet 1    [DISCONTINUED] atorvastatin (LIPITOR) 40 MG tablet Take 1 tablet (40 mg total) by mouth once daily. 90 tablet 3    [DISCONTINUED] lisinopriL (PRINIVIL,ZESTRIL) 20 MG tablet Take 1 tablet (20 mg total) by mouth once daily. 90 tablet 1    [DISCONTINUED] pantoprazole (PROTONIX) 40 MG tablet Take 1 tablet (40 mg total) by mouth once daily. 90 tablet 1       Allergies  Review of patient's allergies indicates:  No Known Allergies    Physical Examination     Vitals:    07/09/24 0959   BP: 110/72   Pulse: 75     Physical Exam  Vitals reviewed.   HENT:      Head: Normocephalic and atraumatic.   Eyes:      Extraocular Movements: Extraocular movements intact.      Conjunctiva/sclera: Conjunctivae normal.      Pupils: Pupils are equal, round, and reactive to light.   Cardiovascular:      Rate and Rhythm: Normal rate and regular rhythm.      Heart sounds: Normal heart sounds.   Pulmonary:      Effort: Pulmonary effort is normal.      Breath sounds: Normal breath sounds.   Musculoskeletal:      Cervical back: Normal range of motion and neck supple.      Comments: Contracture of RUE   Skin:      General: Skin is warm and dry.   Neurological:      Mental Status: He is alert. Mental status is at baseline.      Motor: Weakness (RUE and RLE) present.      Gait: Gait abnormal (WC bound).   Psychiatric:         Mood and Affect: Mood normal.         Behavior: Behavior normal.          Assessment and Plan (including Health Maintenance)      Problem List  Smart Sets  Document Outside HM   :    Plan: This encounter included increased complexity inherent to the evaluation and management associated with medical care services that serve as the continuing focal point for all needed health care services and/or with medical care services that are part of ongoing care related to a patients single, serious condition or a complex condition.  Previous labs and encounters have been reviewed during the course of this visit in order to make medical decisions related to ongoing care of the patient.   Will order home health and PT        Health Maintenance Due   Topic Date Due    Hepatitis C Screening  Never done    Pneumococcal Vaccines (Age 0-64) (1 of 2 - PCV) Never done    HIV Screening  Never done    TETANUS VACCINE  Never done    Colorectal Cancer Screening  Never done    Shingles Vaccine (1 of 2) Never done    COVID-19 Vaccine (2 - 2023-24 season) 09/01/2023       Problem List Items Addressed This Visit          Neuro    History of cardioembolic cerebrovascular accident (CVA) (Chronic)    Relevant Medications    apixaban (ELIQUIS) 5 mg Tab       Cardiac/Vascular    Primary hypertension - Primary (Chronic)    Relevant Medications    lisinopriL (PRINIVIL,ZESTRIL) 20 MG tablet    Mixed hyperlipidemia (Chronic)    Relevant Medications    atorvastatin (LIPITOR) 40 MG tablet       GI    Gastroesophageal reflux disease without esophagitis (Chronic)    Relevant Medications    pantoprazole (PROTONIX) 40 MG tablet       Other    Wheelchair dependent (Chronic)       Health Maintenance Topics with due status: Not Due       Topic Last  Completion Date    Lipid Panel 07/31/2022    High Dose Statin 07/09/2024    Influenza Vaccine Not Due       Future Appointments   Date Time Provider Department Center   10/8/2024 10:30 AM Gilmar Harris MD [facility]            Signature:  MD RACHEL [facility]  MEDICAL GROUP [facility]  [address] [place] [id number]  [phone]    Date of encounter: 7/9/24

## 2024-07-09 NOTE — PATIENT INSTRUCTIONS
We are going to get in touch with home health to see about getting nursing visits and physical therapy at home.  If you have not heard from anyone in the next couple of days, call us and we will check on it for you.  Thanks.

## 2024-08-07 DIAGNOSIS — Z86.73 HISTORY OF CARDIOEMBOLIC CEREBROVASCULAR ACCIDENT (CVA): ICD-10-CM

## 2024-08-07 DIAGNOSIS — G81.91 RIGHT HEMIPLEGIA: Primary | ICD-10-CM

## 2024-08-13 ENCOUNTER — EXTERNAL HOME HEALTH (OUTPATIENT)
Dept: HOME HEALTH SERVICES | Facility: HOSPITAL | Age: 54
End: 2024-08-13
Payer: MEDICAID

## 2024-08-27 ENCOUNTER — CLINICAL SUPPORT (OUTPATIENT)
Dept: REHABILITATION | Facility: HOSPITAL | Age: 54
End: 2024-08-27
Attending: FAMILY MEDICINE
Payer: MEDICAID

## 2024-08-27 DIAGNOSIS — Z86.73 HISTORY OF CARDIOEMBOLIC CEREBROVASCULAR ACCIDENT (CVA): ICD-10-CM

## 2024-08-27 DIAGNOSIS — G81.91 RIGHT HEMIPLEGIA: ICD-10-CM

## 2024-08-27 DIAGNOSIS — Z74.09 IMPAIRED FUNCTIONAL MOBILITY, BALANCE, GAIT, AND ENDURANCE: Primary | ICD-10-CM

## 2024-08-27 PROCEDURE — 97162 PT EVAL MOD COMPLEX 30 MIN: CPT

## 2024-08-27 NOTE — PATIENT INSTRUCTIONS
Long arc quad    Sit in a chair with a backrest - to keep your back straight and prevent you from leaning forward  Straighten your knee as high as you can without leaning forward Repeat 10 Times   Complete 2 Sets          SEATED MARCHING - HIP FLEXION    While sitting in a chair, lift your foot off the ground as you flex your hip and lift your leg. Lower back down and repeat on the opposite leg. Repeat this alternating movement.    Video # [id number] Repeat 10 Times   Hold 1 Second   Complete 2 Sets   Perform 1 Times a Day          Seated Step/Stool Tapping    While seated bring your knee and toes up toward the ceiling to tap the step/stool with your toes. Return the foot to the starting position, focus on keeping those toes up throughout the motion.    Be sure there is a caregiver in contact with you while you do this. Repeat 10 Times   Hold 1 Second   Complete 3 Sets   Perform 3 Times          BALL SQUEEZE HIP ADDUCTION - SEATED    While sitting in a chair, place a ball between your knees. Squeeze the ball with your knees and hold. Relax and repeat.    Video # [id number] Repeat 10 Times   Hold 1 Second   Complete 2 Sets   Perform 1 Times a Day          Squat with WC locked behind and table in front    Position yourself standing in front of a table or counter top with your wheelchair locked behind you. Slowly bend your knees, lowering yourself into a squat position. Ensure that your knees do not move in front of your toes. Repeat 10 Times

## 2024-08-27 NOTE — PLAN OF CARE
[facility]  Physical Therapy Initial Evaluation    Name: Gm Babcock  Clinic Number: [id number]    Therapy Diagnosis:   Encounter Diagnoses   Name Primary?    History of cardioembolic cerebrovascular accident (CVA)     Right hemiplegia      Physician: Gilmar Harris MD    Physician Orders: PT Eval and Treat  Medical Diagnosis from Referral: Z86.73 (ICD-10-CM) - History of cardioembolic cerebrovascular accident (CVA)  G81.91 (ICD-10-CM) - Right hemiplegia  Evaluation Date: 8/27/2024  Authorization Period Expiration: initial evaluation [place]  Plan of Care Expiration: 10/18/24  Visit # / Visits authorized: 1/initial evaluation [place]    Precautions: Standard and blood thinners    Time In: 1238  Time Out: 1315  Total Appointment Time (timed & untimed codes): 38 minutes    Subjective     Date of onset: Patient sister reports he had 2 strokes with the first being 5 years ago and another being 3 years ago.     History of current condition - Jap reports: Patient's sister reports before the stroke, he was independent, now his right side is more effected. Patient walks with a hemiwalker to the wheelchair in the mornings. Other than that, he stays in the chair.      Falls: about a year ago.     Imaging: none:    Prior Therapy: none other than when he was in the hospital.   Social History: lives with their family  Steps/Stairs: none, uses ramp  Occupation: none  Driving: No, but was prior to onset of condition  Durable Medical Equipment: hemiwalker, wheelchair  Dominant Extremity: right  Affected Extremity: right  Prior Level of Function: ind  Current Level of Function: mod to max assistance with adls    Pain:  Current 0/10, worst (maximum)  Location: right leg   Description: Tingling and Numb  Aggravating Factors: Sitting, Standing, Walking, Morning, and Getting out of bed/chair  Easing Factors: pain medication    Pts goals: get  better    Medical History:   Past Medical History:   Diagnosis Date    Aphasia as late effect of cerebrovascular accident     Chronic obstructive pulmonary disease 01/18/2022    Coronary artery disease 01/18/2022    Dysphasia as late effect of cerebrovascular accident (CVA)     Gastroesophageal reflux disease without esophagitis 02/08/2024    Hemiplegia of right dominant side as late effect of cerebral infarction, unspecified hemiplegia type     Mixed hyperlipidemia 01/18/2022    Primary hypertension 01/18/2022    Seizure as late effect of cerebrovascular accident (CVA)        Surgical History:   Gm Babcock  has no past surgical history on file.    Medications:   Gm has a current medication list which includes the following prescription(s): albuterol-ipratropium, apixaban, aspirin, atorvastatin, guaifenesin, lisinopril, and pantoprazole.    Allergies:   Review of patient's allergies indicates:  No Known Allergies     Objective     Range of Motion/Strength:     CERVICAL AROM Pain/Dysfunction with Movement   Flexion 54    Extension 35    Right side bending Min limitation    Left side bending Min limitation    Right rotation Min limitation    Left rotation Min limitation      Shoulder Right Left Pain/Dysfunction with Movement   AROM/PROM      flexion  87 PROM  WFL    extension  WFL  WFL      Elbow Right Left Pain/Dysfunction with Movement   AROM/PROM      flexion  No active today  WFL    extension  No active today  WFL      U/E MMT Right Left Pain/Dysfunction with Movement   Shoulder Flexion trace 4-/5    Shoulder Extension trace 4-/5    Shoulder Abduction trace 4-/5    Shoulder Adduction trace 4-/5        L/E MMT Right Left Pain/Dysfunction with Movement   Hip Flexion trace 4/5    Hip Abduction trace 5/5    Hip Adduction trace 5/5    Knee Flexion trace 4/5    Knee Extension trace 4+/5    Ankle DF trace 4/5    Ankle PF trace 4/5      Posture: rounded head, forward shoulders, increased forward trunk  "lean    Palpation: no ttp    Sensation: no sensation on right leg    Flexibility: increased tone on right    Gait Analysis: NT due to not tolerating standing    TUG:  NT, patient cannot tolerate standing     Balance: poor    Transfers: Max assistance sit to stand     Other: na    Intake Outcome Measure for FOTO  Survey    Therapist reviewed FOTO scores for Gm Babcock on 8/27/2024.   FOTO documents entered into [employer] - see Media section.    Intake Score: 23.26%       Patient Education and Home Exercises     Education provided: Patient educated on the importance of completing skilled physical therapy treatment and home exercise program as prescribed to maximize functional gains.      Written Home Exercises Provided: yes. Exercises were reviewed and Jap was able to demonstrate them prior to the end of the session. Jap demonstrated fair  understanding of the education provided.     See EMR under "Patient Instructions" for exercises provided during therapy sessions.    Assessment     Gm is a 53 y.o. male referred to outpatient physical therapy with a medical diagnosis of Z86.73 (ICD-10-CM) - History of cardioembolic cerebrovascular accident (CVA) G81.91 (ICD-10-CM) - Right hemiplegia . Pt presents to PT nonverbally with sister vocalizing for him. He did not have any movement of the right arm or leg today. When asked to perform range of motion and manual muscle tests, patient became frustrated. Patient completed sit to stand transfers with maximal assistance and did not show good carryover of instruction. He did complete a scoot transfer with mod assistance and showed good carryover of instruction. Patient educated patient that progress would only come with teamwork between therapist and patient. Caretaker was educated on bed mobility and transfer techniques. Will progress with interventions and transfers in order to try to get patient to help caretakers more at home.     Pt prognosis is Guarded.   Pt will " benefit from skilled outpatient Physical Therapy to address the deficits stated above and in the chart below, provide pt/family education, and to maximize pt's level of independence.     Plan of care discussed with patient: Yes  Pt's spiritual, cultural and educational needs considered and pt agreeable to plan of care and goals as stated below:     Anticipated Barriers for therapy: residual effects of stroke.     Medical Necessity is demonstrated by the following:  History  Co-morbidities and personal factors that may impact the plan of care [] LOW: no personal factors / co-morbidities  [] MODERATE: 1-2 personal factors / co-morbidities  [x] HIGH: 3+ personal factors / co-morbidities    Moderate / High Support Documentation:   Co-morbidities affecting plan of care: hemiplegic, cva, residual effects    Personal Factors:   character     Examination  Body Structures and Functions, activity limitations and participation restrictions that may impact the plan of care [] LOW: addressing 1-2 elements  [x] MODERATE: 3+ elements  [] HIGH: 4+ elements (please support below)    Moderate / High Support Documentation: decreased rom, strength, increased tone     Clinical Presentation [] LOW: stable  [x] MODERATE: Evolving  [] HIGH: Unstable     Decision Making/ Complexity Score: moderate       Goals:    Short Term Goals:  Patient will demonstrate independence with home exercise program to ensure carryover of treatment.  Patient will perform supine to/from sit with moderate assistance to improve independence and safety with bed mobility.  Patient will perform sit to/from stand with moderate assistance to improve independence and safety with transfers.  Patient will improve bilateral lower extremity strength to 3+/5 to improve independence and safety with bed mobility, transfers, and gait.  Patient will ambulate 20 feet with a fam walker with moderate assistance to improve independence and safety with gait.     Long Term  Goals:  Patient will perform supine to/from sit with minimal assistance to improve independence and safety with bed mobility.  Patient will perform sit to/from stand with minimal assistance to improve independence and safety with transfers.  Patient will improve bilateral lower extremity strength to 4-/5 to improve independence and safety with bed mobility, transfers, and gait.  Patient will ambulate 50 feet with a fam walker with minimal assistance including up/down curbs and ramps to improve independence and safety with community ambulation.    Plan     Plan of care Certification: 8/27/2024 to 10/18/24.    Outpatient Physical Therapy 2 times weekly for 8 weeks to include the following interventions: Electrical Stimulation russian, Gait Training, Manual Therapy, Moist Heat/ Ice, Neuromuscular Re-ed, Therapeutic Activities, and Therapeutic Exercise.     Clinical Information for Insurance Authorization     Dates of Services: 8/27/24 to 10/18/24.  Discharge Plan: Patient will be discharged from skilled physical therapy treatment once all goals have been met, patient has plateaued, or physician/insurance requests discontinuation of care. Patient will be discharged with a home exercise program.  Diagnosis code(s): z86.73, g81.91  Condition or diagnosis requiring therapy: Neurological  Objective measurements documented in evaluation/re-evaluation: Balance, Coordination (gross and fine motor), Functional mobility, Range of motion (ROM), Sensory signs, and Spasticity     Total visits requested: 16  CPT Codes and Number of Units Requested:  97076 [therapeutic exercise]  Total units: 32  2 unit(s)/visit x 16 visit(s)  Treatment frequency: 2 time(s)/week x 8 week(s)  24356 [neuromuscular re-education]  Total units: 16  32 unit(s)/visit x 16 visit(s)  Treatment frequency: 2 time(s)/week x 8 week(s)  18935 [gait training]  Total units: 32  2 unit(s)/visit x 16 visit(s)  Treatment frequency: 2 time(s)/week x 8 week(s)  09505  [therapeutic activities]  Total units: 32  2 unit(s)/visit x 16 visit(s)  Treatment frequency: 2 time(s)/week x 8 week(s)    Saad Courtney PT, DPT  8/27/2024      Physician's Signature: _________________________________________  Date: __________________________

## 2024-09-16 ENCOUNTER — CLINICAL SUPPORT (OUTPATIENT)
Dept: REHABILITATION | Facility: HOSPITAL | Age: 54
End: 2024-09-16
Attending: FAMILY MEDICINE
Payer: MEDICAID

## 2024-09-16 DIAGNOSIS — G81.91 RIGHT HEMIPLEGIA: Primary | ICD-10-CM

## 2024-09-16 DIAGNOSIS — Z86.73 HISTORY OF CARDIOEMBOLIC CEREBROVASCULAR ACCIDENT (CVA): Primary | Chronic | ICD-10-CM

## 2024-09-16 DIAGNOSIS — G81.91 RIGHT HEMIPLEGIA: ICD-10-CM

## 2024-09-16 DIAGNOSIS — Z74.09 IMPAIRED FUNCTIONAL MOBILITY, BALANCE, GAIT, AND ENDURANCE: ICD-10-CM

## 2024-09-16 PROCEDURE — 97110 THERAPEUTIC EXERCISES: CPT

## 2024-09-16 PROCEDURE — 97112 NEUROMUSCULAR REEDUCATION: CPT

## 2024-09-16 PROCEDURE — 97530 THERAPEUTIC ACTIVITIES: CPT

## 2024-09-16 NOTE — PROGRESS NOTES
[facility]  Physical Therapy Treatment Note    Name: Gm Babcock  Clinic Number: [id number]    Therapy Diagnosis:   Encounter Diagnoses   Name Primary?    History of cardioembolic cerebrovascular accident (CVA) Yes    Right hemiplegia     Impaired functional mobility, balance, gait, and endurance      Physician: Gilmar Harris MD    Visit Date: 9/16/2024    Physician Orders: PT Eval and Treat  Medical Diagnosis from Referral: Z86.73 (ICD-10-CM) - History of cardioembolic cerebrovascular accident (CVA)  G81.91 (ICD-10-CM) - Right hemiplegia  Evaluation Date: 8/27/2024  Authorization Period Expiration: 10/18/24  Plan of Care Expiration: 10/18/24  Visit # / Visits authorized: 1/17  PTA Visit #: 0/5    Time In: 1017  Time Out: 1101  Total Billable Time: 44 minutes    Precautions: Standard and blood thinners  Functional Level Prior to Evaluation: max A from caregiver    Subjective     Pt reports: Patient is non verbal and nods or shakes heads with questions. BP measured at 114/78. Patient points to chest and right side arm and leg when asked if he is in pain. He nods no to minimum and moderate pain, but yes to maximum pain. Patient nods when asked if he did any exercises at home and if they were ok.     He was not compliant with home exercise program.  Response to previous treatment: first treatment since eval    Pain: (unable to rate)/10--- patient reports max pain  Location: right arms and upper legs, chest     Objective     Jap received therapeutic exercises to develop strength, endurance, ROM, and flexibility for 8 minutes including:  Nustep x 5 minutes ( 1 rest break)  Seated hip add x 20  Seated hip abd x 20 yellow TB    Jap received the following manual therapy techniques: Myofacial release and Soft tissue Mobilization were applied to the: bilateral LE for 5 minutes, including:  Bilateral DF stretch  Bilateral hamstring stretch    Jap participated in neuromuscular  re-education activities to improve: Balance, Coordination, Kinesthetic, Sense, Posture, and Proprioception for 10 minutes. The following activities were included:  Long arc quads x 20 each  Seated DF x 20 each  Seated toe taps x 20 each    Jap participated in dynamic functional therapeutic activities to improve functional performance for 20 minutes, including:  Seated march x 20 (mod assistance on R leg)  Manual resistance hip and knee ext x 10 R leg  Sit to stand x 5  Standing weight shifts x 10 (min-mod assistance x 2)    Home Exercises Provided and Patient Education Provided     Education provided: Patient and caregiver educated on the importance of completing skilled physical therapy treatment and home exercise program as prescribed to maximize functional gains.      Written Home Exercises Provided: Patient instructed to cont prior HEP. Exercises were reviewed and Jap was able to demonstrate them prior to the end of the session.  Jap demonstrated good  understanding of the education provided.     See EMR under Patient Instructions for exercises provided prior visit.    Assessment     Gm is a 53 y.o. male referred to outpatient physical therapy with a medical diagnosis of Z86.73 (ICD-10-CM) - History of cardioembolic cerebrovascular accident (CVA) G81.91 (ICD-10-CM) - Right hemiplegia. Patient is nonverbal, instructed to nod head for yes and shake head for no. Patient requires many verbal cues to participate in therapy today. He did complain of chest pain today and was instructed to tell therapist immediately if this worsened or kept going. Patient did complete lower extremity exercises and functional activities today. He completed 4 sit to stand with mod-max assist and 1 sit to stand mod assist x 2. While standing, he completed weight shifts to promote weight bearing on the right side, but needed mod assistance to maintain standing. Instructed patient to continue exercises at home. Will progress as  tolerated.      Jap is progressing well towards his goals.   Pt prognosis is Fair.     Pt will continue to benefit from skilled outpatient physical therapy to address the deficits listed in the problem list box on initial evaluation, provide pt/family education, and to maximize pt's level of independence in the home and community environment.     Pt's spiritual, cultural, and educational needs considered and pt agreeable to plan of care and goals.     Anticipated barriers to physical therapy: patient is nonverbal, does not show good understanding of instruction    Goals:    Short Term Goals:  Patient will demonstrate independence with home exercise program to ensure carryover of treatment.  Patient will perform supine to/from sit with moderate assistance to improve independence and safety with bed mobility.  Patient will perform sit to/from stand with moderate assistance to improve independence and safety with transfers.  Patient will improve bilateral lower extremity strength to 3+/5 to improve independence and safety with bed mobility, transfers, and gait.  Patient will ambulate 20 feet with a fam walker with moderate assistance to improve independence and safety with gait.      Long Term Goals:  Patient will perform supine to/from sit with minimal assistance to improve independence and safety with bed mobility.  Patient will perform sit to/from stand with minimal assistance to improve independence and safety with transfers.  Patient will improve bilateral lower extremity strength to 4-/5 to improve independence and safety with bed mobility, transfers, and gait.  Patient will ambulate 50 feet with a fam walker with minimal assistance including up/down curbs and ramps to improve independence and safety with community ambulation.    Plan     Patient will continue to benefit from skilled physical therapy treatment as prescribed working towards goals listed above to maximize functional potential.        Saad  Roz, PT, DPT  9/16/2024

## 2024-09-18 ENCOUNTER — CLINICAL SUPPORT (OUTPATIENT)
Dept: REHABILITATION | Facility: HOSPITAL | Age: 54
End: 2024-09-18
Attending: FAMILY MEDICINE
Payer: MEDICAID

## 2024-09-18 DIAGNOSIS — G81.91 RIGHT HEMIPLEGIA: ICD-10-CM

## 2024-09-18 DIAGNOSIS — Z86.73 HISTORY OF CARDIOEMBOLIC CEREBROVASCULAR ACCIDENT (CVA): Primary | Chronic | ICD-10-CM

## 2024-09-18 DIAGNOSIS — Z74.09 IMPAIRED FUNCTIONAL MOBILITY, BALANCE, GAIT, AND ENDURANCE: ICD-10-CM

## 2024-09-18 PROCEDURE — 97110 THERAPEUTIC EXERCISES: CPT | Mod: KX

## 2024-09-18 PROCEDURE — 97112 NEUROMUSCULAR REEDUCATION: CPT | Mod: KX

## 2024-09-18 PROCEDURE — 97530 THERAPEUTIC ACTIVITIES: CPT | Mod: KX

## 2024-09-18 NOTE — PROGRESS NOTES
[facility]  Physical Therapy Treatment Note    Name: Gm Babcock  Clinic Number: [id number]    Therapy Diagnosis:   Encounter Diagnoses   Name Primary?    History of cardioembolic cerebrovascular accident (CVA) Yes    Right hemiplegia     Impaired functional mobility, balance, gait, and endurance      Physician: Gilmar Harris MD    Visit Date: 9/18/2024    Physician Orders: PT Eval and Treat  Medical Diagnosis from Referral: Z86.73 (ICD-10-CM) - History of cardioembolic cerebrovascular accident (CVA)  G81.91 (ICD-10-CM) - Right hemiplegia  Evaluation Date: 8/27/2024  Authorization Period Expiration: 10/18/24  Plan of Care Expiration: 10/18/24  Visit # / Visits authorized: 1/17  PTA Visit #: 0/5    Time In: 1107  Time Out: 1150  Total Billable Time: 43 minutes    Precautions: Standard and blood thinners  Functional Level Prior to Evaluation: max A from caregiver    Subjective     Pt reports: feeling okay today with no new complaints.     He was not compliant with home exercise program.  Response to previous treatment: first treatment since eval    Pain: (unable to rate)/10--- patient reports max pain  Location: right arms and upper legs, chest     Objective     Jap received therapeutic exercises to develop strength, endurance, ROM, and flexibility for 10 minutes including:  Nustep x 5 minutes with tactile cuing to push with RLE  Seated hip add x 20    Not performed:   Seated hip abd x 20 yellow TB    Jap received the following manual therapy techniques: Myofacial release and Soft tissue Mobilization were applied to the: bilateral LE for 0 minutes, including:  Bilateral DF stretch  Bilateral hamstring stretch    Jap participated in neuromuscular re-education activities to improve: Balance, Coordination, Kinesthetic, Sense, Posture, and Proprioception for 13 minutes. The following activities were included:  Long arc quads x 20 each  RLE D1 flex/ext 2 x 10    Not performed:    Seated DF x 20 each  Seated toe taps x 20 each    Jap participated in dynamic functional therapeutic activities to improve functional performance for 20 minutes, including:  Seated march x 20 (mod assistance on R leg)  Sit to stand 3 x 5  Bridges 2 x 10 with therapist mod A    Not performed:   Manual resistance hip and knee ext x 10 R leg  Standing weight shifts x 10 (min-mod assistance x 2)    Home Exercises Provided and Patient Education Provided     Education provided: Patient and caregiver educated on the importance of completing skilled physical therapy treatment and home exercise program as prescribed to maximize functional gains.      Written Home Exercises Provided: Patient instructed to cont prior HEP. Exercises were reviewed and Jap was able to demonstrate them prior to the end of the session.  Jap demonstrated good  understanding of the education provided.     See EMR under Patient Instructions for exercises provided prior visit.    Assessment     Pt able to tolerate performing exercises/activities from previous treatment session with good tolerance. Added several new neuro re-ed and therapeutic activities to promote LE muscle recruitment and function. Pt requires verbal cuing throughout session for correct movement patterns and sequencing. He transfers well with need for only CGA. He has apparent R sided neglect. Will continue to attempt to improve functional ability, but due to chronicity of strokes it is difficult to determine functional potential at this time.      Omi is progressing well towards his goals.   Pt prognosis is Fair.     Pt will continue to benefit from skilled outpatient physical therapy to address the deficits listed in the problem list box on initial evaluation, provide pt/family education, and to maximize pt's level of independence in the home and community environment.     Pt's spiritual, cultural, and educational needs considered and pt agreeable to plan of care and goals.      Anticipated barriers to physical therapy: patient is nonverbal, does not show good understanding of instruction    Goals:    Short Term Goals:  Patient will demonstrate independence with home exercise program to ensure carryover of treatment.  Patient will perform supine to/from sit with moderate assistance to improve independence and safety with bed mobility.  Patient will perform sit to/from stand with moderate assistance to improve independence and safety with transfers.  Patient will improve bilateral lower extremity strength to 3+/5 to improve independence and safety with bed mobility, transfers, and gait.  Patient will ambulate 20 feet with a fam walker with moderate assistance to improve independence and safety with gait.      Long Term Goals:  Patient will perform supine to/from sit with minimal assistance to improve independence and safety with bed mobility.  Patient will perform sit to/from stand with minimal assistance to improve independence and safety with transfers.  Patient will improve bilateral lower extremity strength to 4-/5 to improve independence and safety with bed mobility, transfers, and gait.  Patient will ambulate 50 feet with a fam walker with minimal assistance including up/down curbs and ramps to improve independence and safety with community ambulation.    Plan     Patient will continue to benefit from skilled physical therapy treatment as prescribed working towards goals listed above to maximize functional potential.      Karl Issa, PT, DPT  9/18/2024

## 2024-09-26 ENCOUNTER — CLINICAL SUPPORT (OUTPATIENT)
Dept: REHABILITATION | Facility: HOSPITAL | Age: 54
End: 2024-09-26
Payer: MEDICAID

## 2024-09-26 DIAGNOSIS — Z86.73 HISTORY OF CARDIOEMBOLIC CEREBROVASCULAR ACCIDENT (CVA): Primary | Chronic | ICD-10-CM

## 2024-09-26 DIAGNOSIS — Z74.09 IMPAIRED FUNCTIONAL MOBILITY, BALANCE, GAIT, AND ENDURANCE: ICD-10-CM

## 2024-09-26 DIAGNOSIS — G81.91 RIGHT HEMIPLEGIA: ICD-10-CM

## 2024-09-26 PROCEDURE — 97110 THERAPEUTIC EXERCISES: CPT | Mod: CQ

## 2024-09-26 PROCEDURE — 97530 THERAPEUTIC ACTIVITIES: CPT | Mod: CQ

## 2024-09-26 PROCEDURE — 97112 NEUROMUSCULAR REEDUCATION: CPT | Mod: CQ

## 2024-09-26 NOTE — PROGRESS NOTES
[facility]  Physical Therapy Treatment Note    Name: Gm Babcock  Clinic Number: [id number]    Therapy Diagnosis:   Encounter Diagnoses   Name Primary?    History of cardioembolic cerebrovascular accident (CVA) Yes    Right hemiplegia     Impaired functional mobility, balance, gait, and endurance      Physician: Gilmar Harris MD    Visit Date: 9/26/2024    Physician Orders: PT Eval and Treat  Medical Diagnosis from Referral: Z86.73 (ICD-10-CM) - History of cardioembolic cerebrovascular accident (CVA)  G81.91 (ICD-10-CM) - Right hemiplegia  Evaluation Date: 8/27/2024  Authorization Period Expiration: 10/18/24  Plan of Care Expiration: 10/18/24  Visit # / Visits authorized: 4/17  PTA Visit #: 1/5    Time In: 1020  Time Out: 1100  Total Billable Time: 40 minutes    Precautions: Standard and blood thinners  Functional Level Prior to Evaluation: max A from caregiver    Subjective     Pt reports: feeling okay today with no new complaints; patient's caregiver states he has been doing well    He was not compliant with home exercise program.  Response to previous treatment: first treatment since eval    Pain: (unable to rate)/10--- patient reports max pain  Location: right arms and upper legs, chest     Objective     Jap received therapeutic exercises to develop strength, endurance, ROM, and flexibility for 10 minutes including:    Nustep x 5 minutes with tactile cuing to push with RLE  Seated hip add x 20    Not performed:   Seated hip abd x 20 yellow TB    Jap received the following manual therapy techniques: Myofacial release and Soft tissue Mobilization were applied to the: bilateral LE for 0 minutes, including:  Bilateral DF stretch  Bilateral hamstring stretch    Jap participated in neuromuscular re-education activities to improve: Balance, Coordination, Kinesthetic, Sense, Posture, and Proprioception for 12 minutes. The following activities were included:    Long arc  quads x 20 each  RLE D1 flex/ext 2 x 10    Not performed:   Seated DF x 20 each  Seated toe taps x 20 each    Jap participated in dynamic functional therapeutic activities to improve functional performance for 18 minutes, including:    Seated march x 20 (min assistance on R leg)  Sit to stand 3 x 5 (min assistance)  Bridges 2 x 10 with therapist mod A  Chair to mat transfers: x 2 reps; mod A    Not performed:   Manual resistance hip and knee ext x 10 R leg  Standing weight shifts x 10 (min-mod assistance x 2)    Home Exercises Provided and Patient Education Provided     Education provided: Patient and caregiver educated on the importance of completing skilled physical therapy treatment and home exercise program as prescribed to maximize functional gains.      Written Home Exercises Provided: Patient instructed to cont prior HEP. Exercises were reviewed and Jap was able to demonstrate them prior to the end of the session.  Jap demonstrated good  understanding of the education provided.     See EMR under Patient Instructions for exercises provided prior visit.    Assessment     Pt with good tolerance of performing exercises/activities from previous treatment session. Patient in progressing by evidence of requiring less assistance with R leg to perform seated march and Long arc quads today. Pt requires verbal cuing throughout session for correct movement patterns and sequencing, with most difficulty with bridges today. Patient required Terra for transfers today in order to perform correctly and safely. He has apparent R sided neglect. Will continue to attempt to improve functional ability, but due to chronicity of strokes it is difficult to determine functional potential at this time.      Omi is progressing well towards his goals.   Pt prognosis is Fair.     Pt will continue to benefit from skilled outpatient physical therapy to address the deficits listed in the problem list box on initial evaluation, provide  pt/family education, and to maximize pt's level of independence in the home and community environment.     Pt's spiritual, cultural, and educational needs considered and pt agreeable to plan of care and goals.     Anticipated barriers to physical therapy: patient is nonverbal, does not show good understanding of instruction    Goals:    Short Term Goals:  Patient will demonstrate independence with home exercise program to ensure carryover of treatment.  Patient will perform supine to/from sit with moderate assistance to improve independence and safety with bed mobility.  Patient will perform sit to/from stand with moderate assistance to improve independence and safety with transfers.  Patient will improve bilateral lower extremity strength to 3+/5 to improve independence and safety with bed mobility, transfers, and gait.  Patient will ambulate 20 feet with a fam walker with moderate assistance to improve independence and safety with gait.      Long Term Goals:  Patient will perform supine to/from sit with minimal assistance to improve independence and safety with bed mobility.  Patient will perform sit to/from stand with minimal assistance to improve independence and safety with transfers.  Patient will improve bilateral lower extremity strength to 4-/5 to improve independence and safety with bed mobility, transfers, and gait.  Patient will ambulate 50 feet with a fam walker with minimal assistance including up/down curbs and ramps to improve independence and safety with community ambulation.    Plan     Patient will continue to benefit from skilled physical therapy treatment as prescribed working towards goals listed above to maximize functional potential.      Kostas Gorman, PTA  9/26/2024

## 2024-09-30 ENCOUNTER — CLINICAL SUPPORT (OUTPATIENT)
Dept: REHABILITATION | Facility: HOSPITAL | Age: 54
End: 2024-09-30
Payer: MEDICAID

## 2024-09-30 DIAGNOSIS — G81.91 RIGHT HEMIPLEGIA: ICD-10-CM

## 2024-09-30 DIAGNOSIS — Z86.73 HISTORY OF CARDIOEMBOLIC CEREBROVASCULAR ACCIDENT (CVA): Primary | Chronic | ICD-10-CM

## 2024-09-30 DIAGNOSIS — Z74.09 IMPAIRED FUNCTIONAL MOBILITY, BALANCE, GAIT, AND ENDURANCE: ICD-10-CM

## 2024-09-30 PROCEDURE — 97112 NEUROMUSCULAR REEDUCATION: CPT | Mod: KX

## 2024-09-30 PROCEDURE — 97530 THERAPEUTIC ACTIVITIES: CPT | Mod: KX

## 2024-09-30 PROCEDURE — 97110 THERAPEUTIC EXERCISES: CPT | Mod: KX

## 2024-09-30 NOTE — PROGRESS NOTES
[facility]  Physical Therapy Treatment Note    Name: Gm Babcock  Clinic Number: [id number]    Therapy Diagnosis:   Encounter Diagnoses   Name Primary?    History of cardioembolic cerebrovascular accident (CVA) Yes    Right hemiplegia     Impaired functional mobility, balance, gait, and endurance      Physician: Gilmar Harris MD    Visit Date: 9/30/2024    Physician Orders: PT Eval and Treat  Medical Diagnosis from Referral: Z86.73 (ICD-10-CM) - History of cardioembolic cerebrovascular accident (CVA)  G81.91 (ICD-10-CM) - Right hemiplegia  Evaluation Date: 8/27/2024  Authorization Period Expiration: 10/18/24  Plan of Care Expiration: 10/18/24  Visit # / Visits authorized: 5/17  PTA Visit #: 0/5    Time In: 1020  Time Out: 1100  Total Billable Time: 40 minutes    Precautions Standard and blood thinners  Functional Level Prior to Evaluation: max A from caregiver    Subjective     Pt reports: feeling relatively good today with no reports of pain/discomfort    He was not compliant with home exercise program.  Response to previous treatment: first treatment since eval    Pain: (unable to rate)/10--- patient reports max pain  Location: right arms and upper legs, chest     Objective     Jap received therapeutic exercises to develop strength, endurance, ROM, and flexibility for 15 minutes including:    Seated hip abd 2 x 10, manual resistance  Seated LAQ 2 x 10  LTR: 10 each side    Not performed:   Nustep x 5 minutes with tactile cuing to push with RLE    Jap received the following manual therapy techniques: Myofacial release and Soft tissue Mobilization were applied to the: bilateral LE for 0 minutes, including:  Bilateral DF stretch  Bilateral hamstring stretch    Jap participated in neuromuscular re-education activities to improve: Balance, Coordination, Kinesthetic, Sense, Posture, and Proprioception for 10 minutes. The following activities were included:    RLE D1  flex/ext 3 x 10  Hooklying marching (with tactile and verbal cuing) 2 x 10    Not performed:   Seated DF x 20 each  Seated toe taps x 20 each    Jap participated in dynamic functional therapeutic activities to improve functional performance for 15 minutes, including:    Sit to stand 3 x 5 (min assistance)  Bridges 3 x 10 with therapist mod A  Chair to mat transfers: x 2 reps; mod A    Not performed:   Manual resistance hip and knee ext x 10 R leg  Standing weight shifts x 10 (min-mod assistance x 2)    Home Exercises Provided and Patient Education Provided     Education provided: Patient and caregiver educated on the importance of completing skilled physical therapy treatment and home exercise program as prescribed to maximize functional gains.      Written Home Exercises Provided: Patient instructed to cont prior HEP. Exercises were reviewed and Jap was able to demonstrate them prior to the end of the session.  Jap demonstrated good  understanding of the education provided.     See EMR under Patient Instructions for exercises provided prior visit.    Assessment     Continued with exercises/activities from previous treatment session with good tolerance and no exacerbation of symptoms. Pt still requires frequent verbal and tactile cues for correct performance of his activities. Increased volume of select activities for increased musculature recruitment training. With the LE PNF, pt is able to perform flex component well but does struggle with ext and is only able to tolerate very light resistance. He seems very hesitant to stand during sit to stand activity as evidenced by not standing full straight and being very hesitant to release  of table with LUE. Will continue to try to progress with strengthening, neuro re-ed, and functional activities to pt's tolerance.     Omi is progressing well towards his goals.   Pt prognosis is Fair.     Pt will continue to benefit from skilled outpatient physical therapy to  address the deficits listed in the problem list box on initial evaluation, provide pt/family education, and to maximize pt's level of independence in the home and community environment.     Pt's spiritual, cultural, and educational needs considered and pt agreeable to plan of care and goals.     Anticipated barriers to physical therapy: patient is nonverbal, does not show good understanding of instruction    Goals:    Short Term Goals:  Patient will demonstrate independence with home exercise program to ensure carryover of treatment.  Patient will perform supine to/from sit with moderate assistance to improve independence and safety with bed mobility.  Patient will perform sit to/from stand with moderate assistance to improve independence and safety with transfers.  Patient will improve bilateral lower extremity strength to 3+/5 to improve independence and safety with bed mobility, transfers, and gait.  Patient will ambulate 20 feet with a fam walker with moderate assistance to improve independence and safety with gait.      Long Term Goals:  Patient will perform supine to/from sit with minimal assistance to improve independence and safety with bed mobility.  Patient will perform sit to/from stand with minimal assistance to improve independence and safety with transfers.  Patient will improve bilateral lower extremity strength to 4-/5 to improve independence and safety with bed mobility, transfers, and gait.  Patient will ambulate 50 feet with a fam walker with minimal assistance including up/down curbs and ramps to improve independence and safety with community ambulation.    Plan     Patient will continue to benefit from skilled physical therapy treatment as prescribed working towards goals listed above to maximize functional potential.      Karl Issa, PT, DPT    9/30/2024

## 2024-10-02 ENCOUNTER — CLINICAL SUPPORT (OUTPATIENT)
Dept: REHABILITATION | Facility: HOSPITAL | Age: 54
End: 2024-10-02
Payer: MEDICAID

## 2024-10-02 DIAGNOSIS — G81.91 RIGHT HEMIPLEGIA: ICD-10-CM

## 2024-10-02 DIAGNOSIS — Z74.09 IMPAIRED FUNCTIONAL MOBILITY, BALANCE, GAIT, AND ENDURANCE: ICD-10-CM

## 2024-10-02 DIAGNOSIS — Z86.73 HISTORY OF CARDIOEMBOLIC CEREBROVASCULAR ACCIDENT (CVA): Primary | Chronic | ICD-10-CM

## 2024-10-02 PROCEDURE — 97530 THERAPEUTIC ACTIVITIES: CPT

## 2024-10-02 PROCEDURE — 97112 NEUROMUSCULAR REEDUCATION: CPT

## 2024-10-02 PROCEDURE — 97110 THERAPEUTIC EXERCISES: CPT

## 2024-10-02 NOTE — PROGRESS NOTES
[facility]  Physical Therapy Treatment Note    Name: Gm Babcock  Clinic Number: [id number]    Therapy Diagnosis:   No diagnosis found.    Physician: Gilmar Harris MD    Visit Date: 10/2/2024    Physician Orders: PT Eval and Treat  Medical Diagnosis from Referral: Z86.73 (ICD-10-CM) - History of cardioembolic cerebrovascular accident (CVA)  G81.91 (ICD-10-CM) - Right hemiplegia  Evaluation Date: 8/27/2024  Authorization Period Expiration: 10/18/24  Plan of Care Expiration: 10/18/24  Visit # / Visits authorized: 5/17  PTA Visit #: 0/5    Time In: 1235  Time Out: 1313  Total Billable Time: 38 minutes    Precautions Standard and blood thinners  Functional Level Prior to Evaluation: max A from caregiver    Subjective     Pt reports: patient nods head when asked if feeling pain in his legs today.     He was not compliant with home exercise program.  Response to previous treatment: first treatment since eval    Pain: (unable to rate)/10--- patient reports leg pain  Location: right arms and upper legs, chest     Objective     Jap received therapeutic exercises to develop strength, endurance, ROM, and flexibility for 15 minutes including:    Seated hip abd 2 x 10, manual resistance  Seated hip add 2 x 10  Seated LAQ 2 x 10  LTR: 10 each side  Nustep x 5 minutes with tactile cuing to push with RLE    Jap received the following manual therapy techniques: Myofacial release and Soft tissue Mobilization were applied to the: bilateral LE for 0 minutes, including:  Bilateral DF stretch  Bilateral hamstring stretch    Jap participated in neuromuscular re-education activities to improve: Balance, Coordination, Kinesthetic, Sense, Posture, and Proprioception for 15 minutes. The following activities were included:  SLR x 15 R  RLE D1 flex/ext 3 x 10  Hooklying marching (with tactile and verbal cuing) 2 x 10    Not performed:   Seated DF x 20 each  Seated toe taps x 20 each    Jap  participated in dynamic functional therapeutic activities to improve functional performance for 8 minutes, including:    Sit to stand 3 x 5 (min assistance)  Bridges 3 x 10 with therapist mod A  Chair to mat transfers: x 2 reps; mod A    Not performed:   Manual resistance hip and knee ext x 10 R leg  Standing weight shifts x 10 (min-mod assistance x 2)    Home Exercises Provided and Patient Education Provided     Education provided: Patient and caregiver educated on the importance of completing skilled physical therapy treatment and home exercise program as prescribed to maximize functional gains.      Written Home Exercises Provided: Patient instructed to cont prior HEP. Exercises were reviewed and Omi was able to demonstrate them prior to the end of the session.  Omi demonstrated good  understanding of the education provided.     See EMR under Patient Instructions for exercises provided prior visit.    Assessment     Patient still continuing to struggle with extension component of pnf activities on right side. He is able to flex the hip and knee but needs maximal cues for extension component. Patient with mod to max cues to complete all interventions today. Physical Therapist will continue to progress therapeutic exercise, neuromuscular re-education, therapeutic activities, and gait training as able with manual therapy and modalities utilized as needed.      Omi is progressing well towards his goals.   Pt prognosis is Fair.     Pt will continue to benefit from skilled outpatient physical therapy to address the deficits listed in the problem list box on initial evaluation, provide pt/family education, and to maximize pt's level of independence in the home and community environment.     Pt's spiritual, cultural, and educational needs considered and pt agreeable to plan of care and goals.     Anticipated barriers to physical therapy: patient is nonverbal, does not show good understanding of  instruction    Goals:    Short Term Goals:  Patient will demonstrate independence with home exercise program to ensure carryover of treatment.  Patient will perform supine to/from sit with moderate assistance to improve independence and safety with bed mobility.  Patient will perform sit to/from stand with moderate assistance to improve independence and safety with transfers.  Patient will improve bilateral lower extremity strength to 3+/5 to improve independence and safety with bed mobility, transfers, and gait.  Patient will ambulate 20 feet with a fam walker with moderate assistance to improve independence and safety with gait.      Long Term Goals:  Patient will perform supine to/from sit with minimal assistance to improve independence and safety with bed mobility.  Patient will perform sit to/from stand with minimal assistance to improve independence and safety with transfers.  Patient will improve bilateral lower extremity strength to 4-/5 to improve independence and safety with bed mobility, transfers, and gait.  Patient will ambulate 50 feet with a fam walker with minimal assistance including up/down curbs and ramps to improve independence and safety with community ambulation.    Plan     Patient will continue to benefit from skilled physical therapy treatment as prescribed working towards goals listed above to maximize functional potential.      Saad Courtney, PT, DPT   10/2/2024

## 2024-10-07 ENCOUNTER — CLINICAL SUPPORT (OUTPATIENT)
Dept: REHABILITATION | Facility: HOSPITAL | Age: 54
End: 2024-10-07
Payer: MEDICAID

## 2024-10-07 DIAGNOSIS — Z86.73 HISTORY OF CARDIOEMBOLIC CEREBROVASCULAR ACCIDENT (CVA): Primary | Chronic | ICD-10-CM

## 2024-10-07 DIAGNOSIS — Z74.09 IMPAIRED FUNCTIONAL MOBILITY, BALANCE, GAIT, AND ENDURANCE: ICD-10-CM

## 2024-10-07 DIAGNOSIS — G81.91 RIGHT HEMIPLEGIA: ICD-10-CM

## 2024-10-07 PROCEDURE — 97110 THERAPEUTIC EXERCISES: CPT | Mod: CQ

## 2024-10-07 PROCEDURE — 97530 THERAPEUTIC ACTIVITIES: CPT | Mod: CQ

## 2024-10-07 PROCEDURE — 97112 NEUROMUSCULAR REEDUCATION: CPT | Mod: CQ

## 2024-10-07 NOTE — PROGRESS NOTES
[facility]  Physical Therapy Treatment Note    Name: Gm Babcock  Clinic Number: [id number]    Therapy Diagnosis:   Encounter Diagnoses   Name Primary?    History of cardioembolic cerebrovascular accident (CVA) Yes    Right hemiplegia     Impaired functional mobility, balance, gait, and endurance        Physician: Gilmar Harris MD    Visit Date: 10/7/2024    Physician Orders: PT Eval and Treat  Medical Diagnosis from Referral: Z86.73 (ICD-10-CM) - History of cardioembolic cerebrovascular accident (CVA)  G81.91 (ICD-10-CM) - Right hemiplegia  Evaluation Date: 8/27/2024  Authorization Period Expiration: 10/18/24  Plan of Care Expiration: 10/18/24  Visit # / Visits authorized: 7/17  PTA Visit #: 1/5    Time In: 1405  Time Out: 1446  Total Billable Time: 41 minutes    Precautions Standard and blood thinners  Functional Level Prior to Evaluation: max A from caregiver    Subjective     Pt reports: he is doing well and nods he does have pain in his lower extremities today    He was not compliant with home exercise program.  Response to previous treatment: first treatment since eval    Pain: (unable to rate)/10--- patient reports leg pain  Location: right arms and upper legs, chest     Objective     Jap received therapeutic exercises to develop strength, endurance, ROM, and flexibility for 16 minutes including:    Nustep x 5 minutes with tactile cuing to push with right lower extremity (not performed)  Seated hip abd 2 x 10, manual resistance  Seated hip add 2 x 10  Seated LAQ 2 x 10  LTR: 10 each side    Jap received the following manual therapy techniques: Myofacial release and Soft tissue Mobilization were applied to the: bilateral LE for 0 minutes, including:  Bilateral DF stretch  Bilateral hamstring stretch    Jap participated in neuromuscular re-education activities to improve: Balance, Coordination, Kinesthetic, Sense, Posture, and Proprioception for 15 minutes. The  following activities were included:    SLR x 15 R  RLE D1 flex/ext 3 x 10  Hooklying marching (with tactile and verbal cuing) 2 x 10    Not performed:   Seated DF x 20 each  Seated toe taps x 20 each    Jap participated in dynamic functional therapeutic activities to improve functional performance for 10 minutes, including:    Sit to stand 3 x 5 (min assistance)  Bridges 3 x 10 with therapist mod A  Chair to mat transfers: x 2 reps; mod A    Not performed:   Manual resistance hip and knee ext x 10 R leg  Standing weight shifts x 10 (min-mod assistance x 2)    Home Exercises Provided and Patient Education Provided     Education provided: Patient and caregiver educated on the importance of completing skilled physical therapy treatment and home exercise program as prescribed to maximize functional gains.      Written Home Exercises Provided: Patient instructed to cont prior HEP. Exercises were reviewed and Jap was able to demonstrate them prior to the end of the session.  Jap demonstrated good  understanding of the education provided.     See EMR under Patient Instructions for exercises provided prior visit.    Assessment     Patient with no new complaints upon arrival. Patient with good effort overall. Patient still continuing to struggle with extension component of pnf activities on both sides today. Patient had more control of left lower extremity while performing seated activities, but worse with supine activities today. Patient with mod to max cues to complete all interventions today. Patient with no complaints at end of treatment. Physical Therapist Assistant will continue to progress therapeutic exercise, neuromuscular re-education, therapeutic activities, and gait training as able with manual therapy and modalities utilized as needed.      Omi is progressing well towards his goals.   Pt prognosis is Fair.     Pt will continue to benefit from skilled outpatient physical therapy to address the deficits listed in  the problem list box on initial evaluation, provide pt/family education, and to maximize pt's level of independence in the home and community environment.     Pt's spiritual, cultural, and educational needs considered and pt agreeable to plan of care and goals.     Anticipated barriers to physical therapy: patient is nonverbal, does not show good understanding of instruction    Goals:    Short Term Goals:  Patient will demonstrate independence with home exercise program to ensure carryover of treatment.  Patient will perform supine to/from sit with moderate assistance to improve independence and safety with bed mobility.  Patient will perform sit to/from stand with moderate assistance to improve independence and safety with transfers.  Patient will improve bilateral lower extremity strength to 3+/5 to improve independence and safety with bed mobility, transfers, and gait.  Patient will ambulate 20 feet with a fam walker with moderate assistance to improve independence and safety with gait.      Long Term Goals:  Patient will perform supine to/from sit with minimal assistance to improve independence and safety with bed mobility.  Patient will perform sit to/from stand with minimal assistance to improve independence and safety with transfers.  Patient will improve bilateral lower extremity strength to 4-/5 to improve independence and safety with bed mobility, transfers, and gait.  Patient will ambulate 50 feet with a fam walker with minimal assistance including up/down curbs and ramps to improve independence and safety with community ambulation.    Plan     Patient will continue to benefit from skilled physical therapy treatment as prescribed working towards goals listed above to maximize functional potential.      KELVIN Burris  10/7/2024

## 2024-10-08 ENCOUNTER — EXTERNAL HOME HEALTH (OUTPATIENT)
Dept: HOME HEALTH SERVICES | Facility: HOSPITAL | Age: 54
End: 2024-10-08
Payer: MEDICAID

## 2024-10-08 ENCOUNTER — OFFICE VISIT (OUTPATIENT)
Dept: FAMILY MEDICINE | Facility: CLINIC | Age: 54
End: 2024-10-08
Payer: MEDICAID

## 2024-10-08 VITALS
BODY MASS INDEX: 24 KG/M2 | HEART RATE: 74 BPM | TEMPERATURE: 98 F | OXYGEN SATURATION: 98 % | HEIGHT: 69 IN | DIASTOLIC BLOOD PRESSURE: 81 MMHG | SYSTOLIC BLOOD PRESSURE: 121 MMHG | WEIGHT: 162.06 LBS

## 2024-10-08 DIAGNOSIS — E78.2 MIXED HYPERLIPIDEMIA: Chronic | ICD-10-CM

## 2024-10-08 DIAGNOSIS — G81.91 RIGHT HEMIPLEGIA: Chronic | ICD-10-CM

## 2024-10-08 DIAGNOSIS — Z99.3 WHEELCHAIR DEPENDENT: Chronic | ICD-10-CM

## 2024-10-08 DIAGNOSIS — Z86.73 HISTORY OF CARDIOEMBOLIC CEREBROVASCULAR ACCIDENT (CVA): Chronic | ICD-10-CM

## 2024-10-08 DIAGNOSIS — K21.9 GASTROESOPHAGEAL REFLUX DISEASE WITHOUT ESOPHAGITIS: Chronic | ICD-10-CM

## 2024-10-08 DIAGNOSIS — I25.10 CORONARY ARTERY DISEASE, UNSPECIFIED VESSEL OR LESION TYPE, UNSPECIFIED WHETHER ANGINA PRESENT, UNSPECIFIED WHETHER NATIVE OR TRANSPLANTED HEART: Chronic | ICD-10-CM

## 2024-10-08 DIAGNOSIS — I10 PRIMARY HYPERTENSION: Primary | Chronic | ICD-10-CM

## 2024-10-08 DIAGNOSIS — J44.9 CHRONIC OBSTRUCTIVE PULMONARY DISEASE, UNSPECIFIED COPD TYPE: Chronic | ICD-10-CM

## 2024-10-08 PROCEDURE — 3074F SYST BP LT 130 MM HG: CPT | Mod: CPTII,,, | Performed by: FAMILY MEDICINE

## 2024-10-08 PROCEDURE — 4010F ACE/ARB THERAPY RXD/TAKEN: CPT | Mod: CPTII,,, | Performed by: FAMILY MEDICINE

## 2024-10-08 PROCEDURE — 3008F BODY MASS INDEX DOCD: CPT | Mod: CPTII,,, | Performed by: FAMILY MEDICINE

## 2024-10-08 PROCEDURE — 1160F RVW MEDS BY RX/DR IN RCRD: CPT | Mod: CPTII,,, | Performed by: FAMILY MEDICINE

## 2024-10-08 PROCEDURE — 3079F DIAST BP 80-89 MM HG: CPT | Mod: CPTII,,, | Performed by: FAMILY MEDICINE

## 2024-10-08 PROCEDURE — 1159F MED LIST DOCD IN RCRD: CPT | Mod: CPTII,,, | Performed by: FAMILY MEDICINE

## 2024-10-08 PROCEDURE — 99214 OFFICE O/P EST MOD 30 MIN: CPT | Mod: ,,, | Performed by: FAMILY MEDICINE

## 2024-10-08 RX ORDER — IPRATROPIUM BROMIDE AND ALBUTEROL SULFATE 2.5; .5 MG/3ML; MG/3ML
3 SOLUTION RESPIRATORY (INHALATION) EVERY 6 HOURS PRN
Qty: 75 ML | Refills: 3 | Status: SHIPPED | OUTPATIENT
Start: 2024-10-08 | End: 2025-10-08

## 2024-10-08 NOTE — PROGRESS NOTES
Gilmar Harris MD   [facility]  [facility]  [address] [place] [id number]  [phone]      PATIENT NAME: Gm Babcock  : 1970  DATE: 10/8/24  MRN: [id number]      Billing Provider: Gilmar Harris MD  Level of Service: CT OFFICE/OUTPT VISIT, EST, LEVL IV, 30-39 MIN  Patient PCP Information       Provider PCP Type    Gilmar Harris MD General            Reason for Visit / Chief Complaint: Health Maintenance       Update PCP  Update Chief Complaint         History of Present Illness / Problem Focused Workflow     Gm Babcock presents to the clinic with Health Maintenance       54 yo AAM with h/o HTN, hyperlpidemia, CAD, MI.  Has right sided hemiplegia and expressive aphasia due to CVA.  Has home health and is doing rehab therapy twice per week.  No new/acute complaints.  I have reviewed his last visit from 2024 and labs from prior ED visit.               Review of Systems     Review of Systems   Constitutional:  Negative for chills and fever.   Respiratory:  Negative for cough.    Cardiovascular:  Negative for chest pain.   Musculoskeletal:  Positive for gait problem. Negative for arthralgias.   Neurological:  Positive for speech difficulty, weakness and coordination difficulties.        Medical / Social / Family History     Past Medical History:   Diagnosis Date    Aphasia as late effect of cerebrovascular accident     Chronic obstructive pulmonary disease 2022    Coronary artery disease 2022    Dysphasia as late effect of cerebrovascular accident (CVA)     Gastroesophageal reflux disease without esophagitis 2024    Hemiplegia of right dominant side as late effect of cerebral infarction, unspecified hemiplegia type     Mixed hyperlipidemia 2022    Primary hypertension 2022    Seizure as late effect of cerebrovascular accident (CVA)        History reviewed. No pertinent surgical  history.    Social History    reports that he has quit smoking. He has never been exposed to tobacco smoke. He has never used smokeless tobacco. He reports that he does not currently use alcohol.   Social History     Tobacco Use    Smoking status: Former     Passive exposure: Never    Smokeless tobacco: Never   Substance Use Topics    Alcohol use: Not Currently       Family History  Family History   Problem Relation Name Age of Onset    Coronary artery disease Mother         Medications and Allergies     Medications  No outpatient medications have been marked as taking for the 10/8/24 encounter (Office Visit) with Gilmar Harris MD.       Allergies  Review of patient's allergies indicates:  No Known Allergies    Physical Examination     Vitals:    10/08/24 1051   BP: 121/81   Pulse: 74   Temp: 97.6 °F (36.4 °C)     Physical Exam  Vitals reviewed.   HENT:      Head: Normocephalic and atraumatic.   Eyes:      Extraocular Movements: Extraocular movements intact.      Conjunctiva/sclera: Conjunctivae normal.      Pupils: Pupils are equal, round, and reactive to light.   Cardiovascular:      Rate and Rhythm: Normal rate and regular rhythm.      Heart sounds: Normal heart sounds.   Pulmonary:      Effort: Pulmonary effort is normal.      Breath sounds: Normal breath sounds.   Musculoskeletal:      Cervical back: Normal range of motion and neck supple.      Comments: Contracture of RUE   Skin:     General: Skin is warm and dry.   Neurological:      Mental Status: He is alert. Mental status is at baseline.      Motor: Weakness (RUE and RLE) present.      Gait: Gait abnormal (WC bound).   Psychiatric:         Mood and Affect: Mood normal.         Behavior: Behavior normal.          Admission on 04/17/2024, Discharged on 04/17/2024   Component Date Value Ref Range Status    QRS Duration 04/17/2024 82  ms Final    [facility] QTC Calculation 04/17/2024 417  ms Final    Sodium 04/17/2024 141  136 - 145 mmol/L Final    Potassium  04/17/2024 3.7  3.5 - 5.1 mmol/L Final    Chloride 04/17/2024 105  98 - 107 mmol/L Final    CO2 04/17/2024 30  21 - 32 mmol/L Final    Anion Gap 04/17/2024 10  7 - 16 mmol/L Final    Glucose 04/17/2024 88  74 - 106 mg/dL Final    BUN 04/17/2024 24 (H)  7 - 18 mg/dL Final    Creatinine 04/17/2024 0.99  0.70 - 1.30 mg/dL Final    BUN/Creatinine Ratio 04/17/2024 24 (H)  6 - 20 Final    Calcium 04/17/2024 8.9  8.5 - 10.1 mg/dL Final    Total Protein 04/17/2024 8.5 (H)  6.4 - 8.2 g/dL Final    Albumin 04/17/2024 3.6  3.5 - 5.0 g/dL Final    Globulin 04/17/2024 4.9 (H)  2.0 - 4.0 g/dL Final    A/G Ratio 04/17/2024 0.7   Final    Bilirubin, Total 04/17/2024 0.4  >0.0 - 1.2 mg/dL Final    Alk Phos 04/17/2024 65  45 - 115 U/L Final    ALT 04/17/2024 24  16 - 61 U/L Final    AST 04/17/2024 16  15 - 37 U/L Final    eGFR 04/17/2024 91  >=60 mL/min/1.73m2 Final    Troponin I High Sensitivity 04/17/2024 41.8  <=60.4 pg/mL Final    ProBNP 04/17/2024 37  1 - 125 pg/mL Final    Color, UA 04/17/2024 Yellow  Colorless, Straw, Yellow, Light Yellow, Dark Yellow Final    Clarity, UA 04/17/2024 Turbid  Clear Final    pH, UA 04/17/2024 6.5  5.0 to 8.0 pH Units Final    Leukocytes, UA 04/17/2024 Large (A)  Negative Final    Nitrites, UA 04/17/2024 Negative  Negative Final    Protein, UA 04/17/2024 20 (A)  Negative Final    Glucose, UA 04/17/2024 Normal  Normal mg/dL Final    Ketones, UA 04/17/2024 Negative  Negative mg/dL Final    Urobilinogen, UA 04/17/2024 4 (A)  0.2, 1.0, Normal mg/dL Final    Bilirubin, UA 04/17/2024 Negative  Negative Final    Blood, UA 04/17/2024 Trace (A)  Negative Final    Specific Gravity, UA 04/17/2024 1.027  <=1.030 Final    WBC 04/17/2024 9.56  4.50 - 11.00 K/uL Final    RBC 04/17/2024 4.69  4.60 - 6.20 M/uL Final    Hemoglobin 04/17/2024 12.7 (L)  13.5 - 18.0 g/dL Final    Hematocrit 04/17/2024 40.4  40.0 - 54.0 % Final    MCV 04/17/2024 86.1  80.0 - 96.0 fL Final    MCH 04/17/2024 27.1  27.0 - 31.0 pg Final     MCHC 04/17/2024 31.4 (L)  32.0 - 36.0 g/dL Final    RDW 04/17/2024 14.0  11.5 - 14.5 % Final    Platelet Count 04/17/2024 302  150 - 400 K/uL Final    MPV 04/17/2024 10.8  9.4 - 12.4 fL Final    Neutrophils % 04/17/2024 64.1  53.0 - 65.0 % Final    Lymphocytes % 04/17/2024 26.2 (L)  27.0 - 41.0 % Final    Monocytes % 04/17/2024 6.1 (H)  2.0 - 6.0 % Final    Eosinophils % 04/17/2024 3.0  1.0 - 4.0 % Final    Basophils % 04/17/2024 0.3  0.0 - 1.0 % Final    Immature Granulocytes % 04/17/2024 0.3  0.0 - 0.4 % Final    nRBC, Auto 04/17/2024 0.0  <=0.0 % Final    Neutrophils, Abs 04/17/2024 6.13  1.80 - 7.70 K/uL Final    Lymphocytes, Absolute 04/17/2024 2.50  1.00 - 4.80 K/uL Final    Monocytes, Absolute 04/17/2024 0.58  0.00 - 0.80 K/uL Final    Eosinophils, Absolute 04/17/2024 0.29  0.00 - 0.50 K/uL Final    Basophils, Absolute 04/17/2024 0.03  0.00 - 0.20 K/uL Final    Immature Granulocytes, Absolute 04/17/2024 0.03  0.00 - 0.04 K/uL Final    nRBC, Absolute 04/17/2024 0.00  <=0.00 x10e3/uL Final    Diff Type 04/17/2024 Auto   Final    Troponin I High Sensitivity 04/17/2024 39.9  <=60.4 pg/mL Final    WBC, UA 04/17/2024 >182 (H)  <=5 /hpf Final    RBC, UA 04/17/2024 7 (H)  <=3 /hpf Final    Bacteria, UA 04/17/2024 Few (A)  None Seen /hpf Final    Squamous Epithelial Cells, UA 04/17/2024 Occasional (A)  None Seen /HPF Final    Mucous 04/17/2024 Occasional (A)  None Seen /LPF Final    Culture, Urine 04/17/2024 >100,000 Escherichia coli (A)   Final         Assessment and Plan (including Health Maintenance)      Problem List  Smart Sets  Document Outside HM   :    Plan:         Health Maintenance Due   Topic Date Due    Hepatitis C Screening  Never done    Pneumococcal Vaccines (Age 0-64) (1 of 2 - PCV) Never done    HIV Screening  Never done    TETANUS VACCINE  Never done    Colorectal Cancer Screening  Never done    Shingles Vaccine (1 of 2) Never done    Influenza Vaccine (1) Never done    COVID-19 Vaccine (2 - 2024-25  season) 09/01/2024       Problem List Items Addressed This Visit          Neuro    History of cardioembolic cerebrovascular accident (CVA) (Chronic)    Right hemiplegia       Pulmonary    Chronic obstructive pulmonary disease (Chronic)    Relevant Medications    albuterol-ipratropium (DUO-NEB) 2.5 mg-0.5 mg/3 mL nebulizer solution       Cardiac/Vascular    Primary hypertension - Primary (Chronic)    Mixed hyperlipidemia (Chronic)    Coronary artery disease (Chronic)       GI    Gastroesophageal reflux disease without esophagitis (Chronic)       Other    Wheelchair dependent (Chronic)       Health Maintenance Topics with due status: Not Due       Topic Last Completion Date    Lipid Panel 07/31/2022    High Dose Statin 07/09/2024    RSV Vaccine (Age 60+ and Pregnant patients) Not Due       Future Appointments   Date Time Provider Department Center   10/9/2024  9:30 AM Kostas Gorman PTA [facility]   10/14/2024 12:30 PM Kostas Gorman PTA [facility]   10/16/2024 12:30 PM Kostas Gorman PTA [facility]            Signature:  MD [facility]  MEDICAL GROUP [facility] - [facility]  [address] [place] [id number]  [phone]    Date of encounter: 10/8/24

## 2024-10-09 ENCOUNTER — CLINICAL SUPPORT (OUTPATIENT)
Dept: REHABILITATION | Facility: HOSPITAL | Age: 54
End: 2024-10-09
Payer: MEDICAID

## 2024-10-09 DIAGNOSIS — Z74.09 IMPAIRED FUNCTIONAL MOBILITY, BALANCE, GAIT, AND ENDURANCE: ICD-10-CM

## 2024-10-09 DIAGNOSIS — Z86.73 HISTORY OF CARDIOEMBOLIC CEREBROVASCULAR ACCIDENT (CVA): Primary | Chronic | ICD-10-CM

## 2024-10-09 DIAGNOSIS — G81.91 RIGHT HEMIPLEGIA: ICD-10-CM

## 2024-10-09 PROCEDURE — 97110 THERAPEUTIC EXERCISES: CPT | Mod: CQ

## 2024-10-09 PROCEDURE — 97530 THERAPEUTIC ACTIVITIES: CPT | Mod: CQ

## 2024-10-09 PROCEDURE — 97112 NEUROMUSCULAR REEDUCATION: CPT | Mod: CQ

## 2024-10-09 NOTE — PROGRESS NOTES
[facility]  Physical Therapy Treatment Note    Name: Gm Babcock  Clinic Number: [id number]    Therapy Diagnosis:   Encounter Diagnoses   Name Primary?    History of cardioembolic cerebrovascular accident (CVA) Yes    Right hemiplegia     Impaired functional mobility, balance, gait, and endurance        Physician: Gilmar Harris MD    Visit Date: 10/9/2024    Physician Orders: PT Eval and Treat  Medical Diagnosis from Referral: Z86.73 (ICD-10-CM) - History of cardioembolic cerebrovascular accident (CVA)  G81.91 (ICD-10-CM) - Right hemiplegia  Evaluation Date: 8/27/2024  Authorization Period Expiration: 10/18/24  Plan of Care Expiration: 10/18/24  Visit # / Visits authorized: 8/17  PTA Visit #: 2/5    Time In: 0931  Time Out: 1014  Total Billable Time: 43 minutes    Precautions Standard and blood thinners  Functional Level Prior to Evaluation: max A from caregiver    Subjective     Pt reports: patient is nonverbal and nodded to pain in his right lower extremity but not in his right upper extremity today    He was not compliant with home exercise program.    Pain: (unable to rate)/10--- patient reports right leg pain  Location: right arms and upper legs, chest     Objective     Jap received therapeutic exercises to develop strength, endurance, ROM, and flexibility for 16 minutes including:    Seated hip abd 2 x 10, manual resistance  Seated hip add 2 x 10  Seated LAQ 2 x 10  LTR: 10 each side    Not performed:  Nustep x 5 minutes with tactile cuing to push with right lower extremity (not performed)    Jap received the following manual therapy techniques: Myofacial release and Soft tissue Mobilization were applied to the: bilateral LE for 0 minutes, including:  Bilateral DF stretch  Bilateral hamstring stretch    Jap participated in neuromuscular re-education activities to improve: Balance, Coordination, Kinesthetic, Sense, Posture, and Proprioception for 12 minutes. The  following activities were included:    SLR x 15 R  RLE D1 flex/ext 3 x 10  Hooklying marching (with tactile and verbal cuing) 2 x 10    Not performed:   Seated DF x 20 each  Seated toe taps x 20 each    Jap participated in dynamic functional therapeutic activities to improve functional performance for 15 minutes, including:    Sit to stand 3 x 5 (min assistance)  Bridges 3 x 10 with therapist mod A  Chair to mat transfers: x 2 reps; mod A  Manual resistance hip and knee ext 2 x 10 R leg  Standing weight shifts x 10 (min-mod assistance)    Not performed:     Jap participated in Gait training to improve functional mobility and safety for 0  minutes, including:       Home Exercises Provided and Patient Education Provided     Education provided: Patient and caregiver educated on the importance of completing skilled physical therapy treatment and home exercise program as prescribed to maximize functional gains.      Written Home Exercises Provided: Patient instructed to cont prior HEP. Exercises were reviewed and Jap was able to demonstrate them prior to the end of the session.  Jap demonstrated good  understanding of the education provided.     See EMR under Patient Instructions for exercises provided prior visit.    Assessment     Patient with pain in right LE upon arrival, but unable to rate. Patient with good effort overall. Patient still continuing to struggle with extension component of pnf activities on bilateral lower extremities. Patient with good tolerance of standing in parallel bars, performing multiple sit to stands and weight shifting onto right lower extremity to improve strength and tolerance. Patient with mod to max cues to complete all interventions today. Patient with no complaints at end of treatment. Physical Therapist Assistant will continue to progress therapeutic exercise, neuromuscular re-education, therapeutic activities, and gait training as able with manual therapy and modalities utilized as  needed.      Jap is progressing well towards his goals.   Pt prognosis is Fair.     Pt will continue to benefit from skilled outpatient physical therapy to address the deficits listed in the problem list box on initial evaluation, provide pt/family education, and to maximize pt's level of independence in the home and community environment.     Pt's spiritual, cultural, and educational needs considered and pt agreeable to plan of care and goals.     Anticipated barriers to physical therapy: patient is nonverbal, does not show good understanding of instruction    Goals:    Short Term Goals:  Patient will demonstrate independence with home exercise program to ensure carryover of treatment.  Patient will perform supine to/from sit with moderate assistance to improve independence and safety with bed mobility.  Patient will perform sit to/from stand with moderate assistance to improve independence and safety with transfers.  Patient will improve bilateral lower extremity strength to 3+/5 to improve independence and safety with bed mobility, transfers, and gait.  Patient will ambulate 20 feet with a fam walker with moderate assistance to improve independence and safety with gait.      Long Term Goals:  Patient will perform supine to/from sit with minimal assistance to improve independence and safety with bed mobility.  Patient will perform sit to/from stand with minimal assistance to improve independence and safety with transfers.  Patient will improve bilateral lower extremity strength to 4-/5 to improve independence and safety with bed mobility, transfers, and gait.  Patient will ambulate 50 feet with a fam walker with minimal assistance including up/down curbs and ramps to improve independence and safety with community ambulation.    Plan     Patient will continue to benefit from skilled physical therapy treatment as prescribed working towards goals listed above to maximize functional potential.      Kostas Gorman  LPTA  10/9/2024

## 2024-10-14 ENCOUNTER — CLINICAL SUPPORT (OUTPATIENT)
Dept: REHABILITATION | Facility: HOSPITAL | Age: 54
End: 2024-10-14
Payer: MEDICAID

## 2024-10-14 DIAGNOSIS — Z74.09 IMPAIRED FUNCTIONAL MOBILITY, BALANCE, GAIT, AND ENDURANCE: ICD-10-CM

## 2024-10-14 DIAGNOSIS — G81.91 RIGHT HEMIPLEGIA: ICD-10-CM

## 2024-10-14 DIAGNOSIS — Z86.73 HISTORY OF CARDIOEMBOLIC CEREBROVASCULAR ACCIDENT (CVA): Primary | Chronic | ICD-10-CM

## 2024-10-14 PROCEDURE — 97110 THERAPEUTIC EXERCISES: CPT | Mod: CQ

## 2024-10-14 PROCEDURE — 97112 NEUROMUSCULAR REEDUCATION: CPT | Mod: CQ

## 2024-10-14 PROCEDURE — 97530 THERAPEUTIC ACTIVITIES: CPT | Mod: CQ

## 2024-10-14 NOTE — PROGRESS NOTES
[facility]  Physical Therapy Treatment Note    Name: Gm Babcock  Clinic Number: [id number]    Therapy Diagnosis:   Encounter Diagnoses   Name Primary?    History of cardioembolic cerebrovascular accident (CVA) Yes    Right hemiplegia     Impaired functional mobility, balance, gait, and endurance        Physician: Gilmar Harris MD    Visit Date: 10/14/2024    Physician Orders: PT Eval and Treat  Medical Diagnosis from Referral: Z86.73 (ICD-10-CM) - History of cardioembolic cerebrovascular accident (CVA)  G81.91 (ICD-10-CM) - Right hemiplegia  Evaluation Date: 8/27/2024  Authorization Period Expiration: 10/18/24  Plan of Care Expiration: 10/18/24  Visit # / Visits authorized: 8/16  PTA Visit #: 3/5    Time In: 1230  Time Out: 1318  Total Billable Time: 48 minutes    Precautions Standard and blood thinners  Functional Level Prior to Evaluation: max A from caregiver    Subjective     Pt reports: patient is nonverbal and nodded to still having pain in his right lower extremity but no other complaints; patient brought his hemiwalker with him today    He was not compliant with home exercise program.    Pain: (unable to rate)/10--- patient reports right leg pain  Location: right arms and upper legs, chest     Objective     Jap received therapeutic exercises to develop strength, endurance, ROM, and flexibility for 16 minutes including:    Seated hip abd 2 x 10, manual resistance  Seated hip add 2 x 10  Seated LAQ 2 x 10  LTR: 10 each side    Not performed:  Nustep x 5 minutes with tactile cuing to push with right lower extremity (not performed)    Jap received the following manual therapy techniques: Myofacial release and Soft tissue Mobilization were applied to the: bilateral LE for 0 minutes, including:  Bilateral DF stretch  Bilateral hamstring stretch    Jap participated in neuromuscular re-education activities to improve: Balance, Coordination, Kinesthetic, Sense, Posture,  and Proprioception for 15 minutes. The following activities were included:    SLR 2 x 10 R  RLE D1 flex/ext 3 x 10  Hooklying marching (with tactile and verbal cuing) 2 x 10  Seated toe taps: x 20 reps    Not performed:   Seated DF x 20 each    Jap participated in dynamic functional therapeutic activities to improve functional performance for 17 minutes, including:    Sit to stand 3 x 5 (min assistance using hemiwalker)  Bridges 3 x 10 with therapist mod A  Chair to mat transfers using hemiwalker: x 4 reps; min A  Manual resistance hip and knee ext 2 x 10 R leg    Not performed:   Standing weight shifts x 10 (min-mod assistance)    Jap participated in Gait training to improve functional mobility and safety for 0  minutes, including:       Home Exercises Provided and Patient Education Provided     Education provided: Patient and caregiver educated on the importance of completing skilled physical therapy treatment and home exercise program as prescribed to maximize functional gains.      Written Home Exercises Provided: Patient instructed to cont prior HEP. Exercises were reviewed and Jap was able to demonstrate them prior to the end of the session.  Jap demonstrated good  understanding of the education provided.     See EMR under Patient Instructions for exercises provided prior visit.    Assessment     Patient with good effort overall throughout treatment. Patient able to perform all exercises with assistance (right lower extremity) as needed without complaint. Patient with good tolerance of performing multiple chair to mat tranfers using hemiwalker today with minimal assist and verbal/tactile cues on proper sequencing and technique. Patient with mod to max cues to complete all interventions today. Patient's transfers are progressively improving each treatment. Patient with no complaints at end of treatment. Physical Therapist Assistant will continue to progress therapeutic exercise, neuromuscular re-education,  therapeutic activities, and gait training as able with manual therapy and modalities utilized as needed.      Jap is progressing well towards his goals.   Pt prognosis is Fair.     Pt will continue to benefit from skilled outpatient physical therapy to address the deficits listed in the problem list box on initial evaluation, provide pt/family education, and to maximize pt's level of independence in the home and community environment.     Pt's spiritual, cultural, and educational needs considered and pt agreeable to plan of care and goals.     Anticipated barriers to physical therapy: patient is nonverbal, does not show good understanding of instruction    Goals:    Short Term Goals:  Patient will demonstrate independence with home exercise program to ensure carryover of treatment.  Patient will perform supine to/from sit with moderate assistance to improve independence and safety with bed mobility.  Patient will perform sit to/from stand with moderate assistance to improve independence and safety with transfers.  Patient will improve bilateral lower extremity strength to 3+/5 to improve independence and safety with bed mobility, transfers, and gait.  Patient will ambulate 20 feet with a fam walker with moderate assistance to improve independence and safety with gait.      Long Term Goals:  Patient will perform supine to/from sit with minimal assistance to improve independence and safety with bed mobility.  Patient will perform sit to/from stand with minimal assistance to improve independence and safety with transfers.  Patient will improve bilateral lower extremity strength to 4-/5 to improve independence and safety with bed mobility, transfers, and gait.  Patient will ambulate 50 feet with a fam walker with minimal assistance including up/down curbs and ramps to improve independence and safety with community ambulation.    Plan     Patient will continue to benefit from skilled physical therapy treatment as  prescribed working towards goals listed above to maximize functional potential.      KELVIN Burris  10/14/2024

## 2024-10-16 ENCOUNTER — CLINICAL SUPPORT (OUTPATIENT)
Dept: REHABILITATION | Facility: HOSPITAL | Age: 54
End: 2024-10-16
Payer: MEDICAID

## 2024-10-16 DIAGNOSIS — Z86.73 HISTORY OF CARDIOEMBOLIC CEREBROVASCULAR ACCIDENT (CVA): Primary | Chronic | ICD-10-CM

## 2024-10-16 DIAGNOSIS — G81.91 RIGHT HEMIPLEGIA: ICD-10-CM

## 2024-10-16 DIAGNOSIS — Z74.09 IMPAIRED FUNCTIONAL MOBILITY, BALANCE, GAIT, AND ENDURANCE: ICD-10-CM

## 2024-10-16 PROCEDURE — 97530 THERAPEUTIC ACTIVITIES: CPT | Mod: KX

## 2024-10-16 PROCEDURE — 97110 THERAPEUTIC EXERCISES: CPT | Mod: KX

## 2024-10-16 NOTE — PLAN OF CARE
"[facility]PAT[facility]  Physical Therapy Discharge Summary     Name: Gm Babcock  Clinic Number: [id number]     Therapy Diagnosis:        Encounter Diagnoses   Name Primary?    History of cardioembolic cerebrovascular accident (CVA) Yes    Right hemiplegia      Impaired functional mobility, balance, gait, and endurance           Physician: Gilmar Harris MD     Visit Date: 10/16/2024      Physician Orders: PT Eval and Treat  Medical Diagnosis from Referral: Z86.73 (ICD-10-CM) - History of cardioembolic cerebrovascular accident (CVA)  G81.91 (ICD-10-CM) - Right hemiplegia  Evaluation Date: 8/27/2024  Authorization Period Expiration: 10/18/24  Plan of Care Expiration: 10/18/24  Visit # / Visits authorized: 8/16  PTA Visit #: 0/5     Time In: 1238  Time Out: 1305  Total Billable Time: 27 minutes     Precautions Standard and blood thinners  Functional Level Prior to Evaluation: max A from caregiver     Subjective      Pt reports: no new complaints of pain or discomfort today.      He was not compliant with home exercise program.     Pain: (unable to rate)/10--- patient reports right leg pain  Location: right arms and upper legs, chest      Objective      Jap received therapeutic exercises to develop strength, endurance, ROM, and flexibility for 10 minutes including:     Pt and caregiver education: reaching maximum benefit of PT at this time; using PT in short bouts throughout the year for "tune ups"  Nustep x 5 minutes     Not performed:  Seated hip abd 2 x 10, manual resistance  Seated hip add 2 x 10  Seated LAQ 2 x 10  LTR: 10 each side     Jap received the following manual therapy techniques: Myofacial release and Soft tissue Mobilization were applied to the: bilateral LE for 0 minutes, including:  Bilateral DF stretch  Bilateral hamstring stretch     Jap participated in neuromuscular re-education activities to improve: Balance, Coordination, Kinesthetic, Sense, Posture, and " Proprioception for 0 minutes. The following activities were included:     SLR 2 x 10 R  RLE D1 flex/ext 3 x 10  Hooklying marching (with tactile and verbal cuing) 2 x 10  Seated toe taps: x 20 reps     Not performed:   Seated DF x 20 each     Jap participated in dynamic functional therapeutic activities to improve functional performance for 17 minutes, including:     Ambulation: 2 lengths of parallel bars with gait belt, CGA, and verbal/tactile cuing for RLE placement  Ambulation: 8 feet with hemiwalker, gait belt, CGA, verbal/tactile cuing for RLE placement, and max A x2    Not performed:   Standing weight shifts x 10 (min-mod assistance)  Sit to stand 3 x 5 (min assistance using hemiwalker)  Bridges 3 x 10 with therapist mod A  Chair to mat transfers using hemiwalker: x 4 reps; min A  Manual resistance hip and knee ext 2 x 10 R leg     Jap participated in Gait training to improve functional mobility and safety for 0  minutes, including:         Home Exercises Provided and Patient Education Provided      Education provided: Patient and caregiver educated on the importance of completing skilled physical therapy treatment and home exercise program as prescribed to maximize functional gains.       Written Home Exercises Provided: Patient instructed to cont prior HEP. Exercises were reviewed and Jap was able to demonstrate them prior to the end of the session.  Jap demonstrated good  understanding of the education provided.      See EMR under Patient Instructions for exercises provided prior visit.     Assessment      Pt at this time has reached maximum benefit of PT. He is able to transfer using hemiwalker and mod assist. He is now even walking short distances with hemiwalker and max assist x2. His caregiver said that pt is also able to walk as mentioned at home. At this point pt would benefit from multiple short bouts of PT throughout the year as opposed to excessively long episodes of care. Pt and caregiver are  agreeable with plan to DC at this time.      Jap is progressing fairly towards his goals.   Pt prognosis is Fair.      Pt's spiritual, cultural, and educational needs considered and pt agreeable to plan of care and goals.     Anticipated barriers to physical therapy: patient is nonverbal, does not show good understanding of instruction     Goals:     Short Term Goals:  Patient will demonstrate independence with home exercise program to ensure carryover of treatment. MET  Patient will perform supine to/from sit with moderate assistance to improve independence and safety with bed mobility. MET  Patient will perform sit to/from stand with moderate assistance to improve independence and safety with transfers. MET  Patient will improve bilateral lower extremity strength to 3+/5 to improve independence and safety with bed mobility, transfers, and gait. NEARLY MET  Patient will ambulate 20 feet with a fam walker with moderate assistance to improve independence and safety with gait. NEARLY MET     Long Term Goals:  Patient will perform supine to/from sit with minimal assistance to improve independence and safety with bed mobility. NOT MET  Patient will perform sit to/from stand with minimal assistance to improve independence and safety with transfers. NOT MET  Patient will improve bilateral lower extremity strength to 4-/5 to improve independence and safety with bed mobility, transfers, and gait. NOT MET  Patient will ambulate 50 feet with a fam walker with minimal assistance including up/down curbs and ramps to improve independence and safety with community ambulation. NOT MET     Plan      Patient will continue to benefit from skilled physical therapy treatment as prescribed working towards goals listed above to maximize functional potential.       Karl Issa, PT, DPT    10/16/2024

## 2025-04-24 ENCOUNTER — OFFICE VISIT (OUTPATIENT)
Dept: FAMILY MEDICINE | Facility: CLINIC | Age: 55
End: 2025-04-24
Payer: MEDICAID

## 2025-04-24 VITALS
DIASTOLIC BLOOD PRESSURE: 75 MMHG | BODY MASS INDEX: 24 KG/M2 | WEIGHT: 162.06 LBS | SYSTOLIC BLOOD PRESSURE: 122 MMHG | HEIGHT: 69 IN | OXYGEN SATURATION: 98 % | HEART RATE: 87 BPM

## 2025-04-24 DIAGNOSIS — M62.838 MUSCLE SPASM: ICD-10-CM

## 2025-04-24 DIAGNOSIS — Z99.3 WHEELCHAIR DEPENDENT: Chronic | ICD-10-CM

## 2025-04-24 DIAGNOSIS — Z74.09 IMPAIRED FUNCTIONAL MOBILITY, BALANCE, GAIT, AND ENDURANCE: Chronic | ICD-10-CM

## 2025-04-24 DIAGNOSIS — G81.91 RIGHT HEMIPLEGIA: Chronic | ICD-10-CM

## 2025-04-24 DIAGNOSIS — E78.2 MIXED HYPERLIPIDEMIA: Chronic | ICD-10-CM

## 2025-04-24 DIAGNOSIS — Z86.73 HISTORY OF CARDIOEMBOLIC CEREBROVASCULAR ACCIDENT (CVA): Chronic | ICD-10-CM

## 2025-04-24 DIAGNOSIS — I10 PRIMARY HYPERTENSION: Primary | Chronic | ICD-10-CM

## 2025-04-24 DIAGNOSIS — K21.9 GASTROESOPHAGEAL REFLUX DISEASE WITHOUT ESOPHAGITIS: Chronic | ICD-10-CM

## 2025-04-24 RX ORDER — LISINOPRIL 20 MG/1
20 TABLET ORAL DAILY
Qty: 90 TABLET | Refills: 1 | Status: SHIPPED | OUTPATIENT
Start: 2025-04-24

## 2025-04-24 RX ORDER — TIZANIDINE 2 MG/1
TABLET ORAL
Qty: 60 TABLET | Refills: 2 | Status: SHIPPED | OUTPATIENT
Start: 2025-04-24

## 2025-04-24 RX ORDER — PANTOPRAZOLE SODIUM 40 MG/1
40 TABLET, DELAYED RELEASE ORAL DAILY
Qty: 90 TABLET | Refills: 1 | Status: SHIPPED | OUTPATIENT
Start: 2025-04-24

## 2025-04-24 RX ORDER — ATORVASTATIN CALCIUM 40 MG/1
40 TABLET, FILM COATED ORAL DAILY
Qty: 90 TABLET | Refills: 3 | Status: SHIPPED | OUTPATIENT
Start: 2025-04-24

## 2025-04-24 NOTE — PROGRESS NOTES
Gilmar Harris MD   [facility]  [facility] [address] [place] [address]  [phone]      PATIENT NAME: Gm Babcock  : 1970  DATE: 25  MRN: [id number]      Billing Provider: Gilmar Harris MD  Level of Service:   Patient PCP Information       Provider PCP Type    Gilmar Harris MD General            Reason for Visit / Chief Complaint: Medication Refill, Health Maintenance (Hepatitis C Screening Never done/HIV Screening Never done/TETANUS VACCINE Never done/Pneumococcal Vaccines (Age 50+)(1 of 2 - PCV) Never done/Colorectal Cancer Screening Never done/Shingles Vaccine(1 of 2) Never done/Influenza Vaccine(1) Never done/COVID-19 Vaccine( season) due on 2024/POSTPONE ALL), and Other (Request Rx for new wheelchair due to his current wheelchair being broken. )       Update PCP  Update Chief Complaint         History of Present Illness / Problem Focused Workflow     Gm Babcock presents to the clinic with Medication Refill, Health Maintenance (Hepatitis C Screening Never done/HIV Screening Never done/TETANUS VACCINE Never done/Pneumococcal Vaccines (Age 50+)(1 of 2 - PCV) Never done/Colorectal Cancer Screening Never done/Shingles Vaccine(1 of 2) Never done/Influenza Vaccine(1) Never done/COVID-19 Vaccine( season) due on 2024/POSTPONE ALL), and Other (Request Rx for new wheelchair due to his current wheelchair being broken. )       Follow up 55 yo AAM with HTN, HLD, GERD and h/o CVA.  He needs his medications refilled.  Needs rx for new wheelchair.  The one he has is old and starting to deteriorate.  Otherwise, his chronic conditions are stable.      Medication Refill  Associated symptoms include weakness. Pertinent negatives include no arthralgias, chest pain, chills, coughing or fever.       Review of Systems     Review of Systems   Constitutional:  Negative for chills and fever.    Respiratory:  Negative for cough.    Cardiovascular:  Negative for chest pain.   Musculoskeletal:  Positive for gait problem. Negative for arthralgias.   Neurological:  Positive for speech difficulty, weakness and coordination difficulties.        Medical / Social / Family History     Past Medical History:   Diagnosis Date    Aphasia as late effect of cerebrovascular accident     Chronic obstructive pulmonary disease 01/18/2022    Coronary artery disease 01/18/2022    Dysphasia as late effect of cerebrovascular accident (CVA)     Gastroesophageal reflux disease without esophagitis 02/08/2024    Hemiplegia of right dominant side as late effect of cerebral infarction, unspecified hemiplegia type     Mixed hyperlipidemia 01/18/2022    Primary hypertension 01/18/2022    Seizure as late effect of cerebrovascular accident (CVA)        History reviewed. No pertinent surgical history.    Social History    reports that he has quit smoking. He has never been exposed to tobacco smoke. He has never used smokeless tobacco. He reports that he does not currently use alcohol.   Social History[1]    Family History  Family History   Problem Relation Name Age of Onset    Coronary artery disease Mother         Medications and Allergies     Medications  Outpatient Medications Marked as Taking for the 4/24/25 encounter (Office Visit) with Gilmar Harris MD   Medication Sig Dispense Refill    albuterol-ipratropium (DUO-NEB) 2.5 mg-0.5 mg/3 mL nebulizer solution Take 3 mLs by nebulization every 6 (six) hours as needed for Wheezing. Rescue 75 mL 3    aspirin (ECOTRIN) 81 MG EC tablet Take 81 mg by mouth once daily.      [DISCONTINUED] apixaban (ELIQUIS) 5 mg Tab Take 1 tablet (5 mg total) by mouth 2 (two) times daily. 180 tablet 1    [DISCONTINUED] atorvastatin (LIPITOR) 40 MG tablet Take 1 tablet (40 mg total) by mouth once daily. 90 tablet 3    [DISCONTINUED] lisinopriL (PRINIVIL,ZESTRIL) 20 MG tablet Take 1 tablet (20 mg total) by  mouth once daily. 90 tablet 1    [DISCONTINUED] pantoprazole (PROTONIX) 40 MG tablet Take 1 tablet (40 mg total) by mouth once daily. 90 tablet 1       Allergies  Review of patient's allergies indicates:  No Known Allergies    Physical Examination     Vitals:    04/24/25 1357   BP: 122/75   Pulse: 87     Physical Exam  Vitals reviewed.   HENT:      Head: Normocephalic and atraumatic.   Eyes:      Extraocular Movements: Extraocular movements intact.      Conjunctiva/sclera: Conjunctivae normal.      Pupils: Pupils are equal, round, and reactive to light.   Cardiovascular:      Rate and Rhythm: Normal rate and regular rhythm.      Heart sounds: Normal heart sounds.   Pulmonary:      Effort: Pulmonary effort is normal.      Breath sounds: Normal breath sounds.   Musculoskeletal:      Cervical back: Normal range of motion and neck supple.      Comments: Contracture of RUE   Skin:     General: Skin is warm and dry.   Neurological:      Mental Status: He is alert. Mental status is at baseline.      Motor: Weakness (RUE and RLE) present.      Gait: Gait abnormal (WC bound).   Psychiatric:         Mood and Affect: Mood normal.         Behavior: Behavior normal.          Assessment and Plan (including Health Maintenance)      Problem List  Smart Sets  Document Outside HM   :    Plan: written rx provided for new wheelchair        Health Maintenance Due   Topic Date Due    Hepatitis C Screening  Never done    HIV Screening  Never done    TETANUS VACCINE  Never done    Pneumococcal Vaccines (Age 50+) (1 of 2 - PCV) Never done    Colorectal Cancer Screening  Never done    Shingles Vaccine (1 of 2) Never done    Influenza Vaccine (1) Never done    COVID-19 Vaccine (2 - 2024-25 season) 09/01/2024       Problem List Items Addressed This Visit          Neuro    History of cardioembolic cerebrovascular accident (CVA) (Chronic)    Relevant Medications    apixaban (ELIQUIS) 5 mg Tab    Right hemiplegia       Cardiac/Vascular    Primary  hypertension - Primary (Chronic)    Relevant Medications    lisinopriL (PRINIVIL,ZESTRIL) 20 MG tablet    Mixed hyperlipidemia (Chronic)    Relevant Medications    atorvastatin (LIPITOR) 40 MG tablet       GI    Gastroesophageal reflux disease without esophagitis (Chronic)    Relevant Medications    pantoprazole (PROTONIX) 40 MG tablet       Other    Wheelchair dependent (Chronic)    Impaired functional mobility, balance, gait, and endurance     Other Visit Diagnoses         Muscle spasm        Relevant Medications    tiZANidine (ZANAFLEX) 2 MG tablet            Health Maintenance Topics with due status: Not Due       Topic Last Completion Date    Lipid Panel 07/31/2022    High Dose Statin 04/24/2025    RSV Vaccine (Age 60+ and Pregnant patients) Not Due       No future appointments.         Signature:  MD RACHEL [facility]  [facility]  [address] [place] [id number] [phone]    Date of encounter: 4/24/25         [1]   Social History  Tobacco Use    Smoking status: Former     Passive exposure: Never    Smokeless tobacco: Never   Substance Use Topics    Alcohol use: Not Currently

## 2025-08-22 ENCOUNTER — TELEPHONE (OUTPATIENT)
Dept: FAMILY MEDICINE | Facility: CLINIC | Age: 55
End: 2025-08-22
Payer: MEDICAID